# Patient Record
Sex: MALE | Race: WHITE | NOT HISPANIC OR LATINO | Employment: OTHER | URBAN - METROPOLITAN AREA
[De-identification: names, ages, dates, MRNs, and addresses within clinical notes are randomized per-mention and may not be internally consistent; named-entity substitution may affect disease eponyms.]

---

## 2023-03-22 ENCOUNTER — TELEPHONE (OUTPATIENT)
Dept: PRIMARY CARE | Facility: CLINIC | Age: 68
End: 2023-03-22

## 2023-03-22 NOTE — TELEPHONE ENCOUNTER
Pt. Left a message stating his Left foot is swollen 2 sizes over night. Pt. Has an appt. With Dr. Arroyo on 4/4/23 but it sounded as if he should be seen prior? Please Advise.

## 2023-03-22 NOTE — TELEPHONE ENCOUNTER
Called patient.  He awoke with a red swollen foot and ankle this morning.  Advised to go to the ER to rule out a serious health issue.

## 2023-03-24 NOTE — TELEPHONE ENCOUNTER
Patient went to ER and was diagnosed with cellulitis. He was given antibiotics. He would like a referral to a podiatrist in either [place] or [place].

## 2023-03-28 DIAGNOSIS — L03.119 CELLULITIS AND ABSCESS OF FOOT: ICD-10-CM

## 2023-03-28 DIAGNOSIS — L02.619 CELLULITIS AND ABSCESS OF FOOT: ICD-10-CM

## 2023-04-03 PROBLEM — R35.1 BPH ASSOCIATED WITH NOCTURIA: Status: ACTIVE | Noted: 2023-04-03

## 2023-04-03 PROBLEM — E78.5 HYPERLIPIDEMIA: Status: ACTIVE | Noted: 2023-04-03

## 2023-04-03 PROBLEM — R79.89 ELEVATED SERUM CREATININE: Status: ACTIVE | Noted: 2023-04-03

## 2023-04-03 PROBLEM — E79.0 HYPERURICEMIA: Status: ACTIVE | Noted: 2023-04-03

## 2023-04-03 PROBLEM — I10 PRIMARY HYPERTENSION: Status: ACTIVE | Noted: 2023-04-03

## 2023-04-03 PROBLEM — N40.1 BPH ASSOCIATED WITH NOCTURIA: Status: ACTIVE | Noted: 2023-04-03

## 2023-04-03 ASSESSMENT — ACTIVITIES OF DAILY LIVING (ADL)
BATHING: INDEPENDENT
DRESSING: INDEPENDENT

## 2023-04-03 ASSESSMENT — ENCOUNTER SYMPTOMS
LOSS OF SENSATION IN FEET: 0
DEPRESSION: 0
OCCASIONAL FEELINGS OF UNSTEADINESS: 0

## 2023-04-03 ASSESSMENT — PATIENT HEALTH QUESTIONNAIRE - PHQ9
SUM OF ALL RESPONSES TO PHQ9 QUESTIONS 1 AND 2: 0
2. FEELING DOWN, DEPRESSED OR HOPELESS: NOT AT ALL
1. LITTLE INTEREST OR PLEASURE IN DOING THINGS: NOT AT ALL

## 2023-04-03 NOTE — PROGRESS NOTES
Subjective   Reason for Visit: Flavio Velazquez is an 67 y.o. male here for a Medicare Wellness visit.     Past Medical, Surgical, and Family History reviewed and updated in chart.    Reviewed all medications by prescribing practitioner or clinical pharmacist (such as prescriptions, OTCs, herbal therapies and supplements) and documented in the medical record.    He is being followed for hyperlipidemia, hypertension, chronic atrial fibrillation, and hyperuricemia.  Recently he was seen in the emergency room for cellulitis of his foot and followed up with podiatry and has been successfully treated with Augmentin.  In general, he is feeling fine and his blood pressure is well controlled.        Patient Care Team:  Quan Corbett MD as PCP - General  Quan Corbett MD as PCP - [employer] PCP     Review of Systems   All other systems reviewed and are negative.      Objective   Vitals:  /76 (BP Location: Left arm, Patient Position: Sitting, BP Cuff Size: Adult)   Pulse 76   Temp 36 °C (96.8 °F) (Skin)   Ht 1.829 m (6')   Wt 105 kg (232 lb 6.4 oz)   SpO2 97%   BMI 31.52 kg/m²       Physical Exam  Constitutional:       Appearance: Normal appearance.   HENT:      Head: Normocephalic and atraumatic.   Cardiovascular:      Rate and Rhythm: Normal rate and regular rhythm.   Pulmonary:      Effort: Pulmonary effort is normal.      Breath sounds: Normal breath sounds.   Abdominal:      General: Abdomen is flat.      Palpations: Abdomen is soft.   Musculoskeletal:      Cervical back: Normal range of motion and neck supple.   Neurological:      General: No focal deficit present.      Mental Status: He is alert and oriented to person, place, and time.   Psychiatric:         Mood and Affect: Mood normal.         Behavior: Behavior normal.         Thought Content: Thought content normal.         Judgment: Judgment normal.         Assessment/Plan   Problem List Items Addressed This Visit       Primary  hypertension    Relevant Orders    CBC    Comprehensive Metabolic Panel    Hyperlipidemia    Relevant Orders    Lipid Panel    Hyperuricemia    Relevant Orders    Uric acid    BPH associated with nocturia    Relevant Orders    Prostate Specific Antigen    RESOLVED: Elevated serum creatinine     Other Visit Diagnoses       Routine general medical examination at [facility]    -  Primary Medicare annual wellness visit, subsequent        Annual physical exam        Relevant Orders    Fecal Occult Blood Immunoassy

## 2023-04-04 ENCOUNTER — OFFICE VISIT (OUTPATIENT)
Dept: PRIMARY CARE | Facility: CLINIC | Age: 68
End: 2023-04-04
Payer: MEDICARE

## 2023-04-04 ENCOUNTER — LAB (OUTPATIENT)
Dept: LAB | Facility: LAB | Age: 68
End: 2023-04-04
Payer: MEDICARE

## 2023-04-04 VITALS
BODY MASS INDEX: 31.48 KG/M2 | HEART RATE: 76 BPM | DIASTOLIC BLOOD PRESSURE: 76 MMHG | SYSTOLIC BLOOD PRESSURE: 130 MMHG | OXYGEN SATURATION: 97 % | WEIGHT: 232.4 LBS | HEIGHT: 72 IN | TEMPERATURE: 96.8 F

## 2023-04-04 DIAGNOSIS — E79.0 HYPERURICEMIA: ICD-10-CM

## 2023-04-04 DIAGNOSIS — N40.1 BPH ASSOCIATED WITH NOCTURIA: ICD-10-CM

## 2023-04-04 DIAGNOSIS — E78.5 HYPERLIPIDEMIA, UNSPECIFIED HYPERLIPIDEMIA TYPE: ICD-10-CM

## 2023-04-04 DIAGNOSIS — I10 PRIMARY HYPERTENSION: ICD-10-CM

## 2023-04-04 DIAGNOSIS — K21.9 GASTROESOPHAGEAL REFLUX DISEASE WITHOUT ESOPHAGITIS: ICD-10-CM

## 2023-04-04 DIAGNOSIS — Z00.00 ROUTINE GENERAL MEDICAL EXAMINATION AT HEALTH CARE FACILITY: Primary | ICD-10-CM

## 2023-04-04 DIAGNOSIS — Z00.00 MEDICARE ANNUAL WELLNESS VISIT, SUBSEQUENT: ICD-10-CM

## 2023-04-04 DIAGNOSIS — R35.1 BPH ASSOCIATED WITH NOCTURIA: ICD-10-CM

## 2023-04-04 DIAGNOSIS — R79.89 ELEVATED SERUM CREATININE: ICD-10-CM

## 2023-04-04 DIAGNOSIS — Z00.00 ANNUAL PHYSICAL EXAM: ICD-10-CM

## 2023-04-04 LAB
CHOLESTEROL (MG/DL) IN SER/PLAS: 148 MG/DL (ref 0–199)
CHOLESTEROL IN HDL (MG/DL) IN SER/PLAS: 45.5 MG/DL
CHOLESTEROL/HDL RATIO: 3.3
ERYTHROCYTE DISTRIBUTION WIDTH (RATIO) BY AUTOMATED COUNT: 14.6 % (ref 11.5–14.5)
ERYTHROCYTE MEAN CORPUSCULAR HEMOGLOBIN CONCENTRATION (G/DL) BY AUTOMATED: 33.4 G/DL (ref 32–36)
ERYTHROCYTE MEAN CORPUSCULAR VOLUME (FL) BY AUTOMATED COUNT: 99 FL (ref 80–100)
ERYTHROCYTES (10*6/UL) IN BLOOD BY AUTOMATED COUNT: 4.03 X10E12/L (ref 4.5–5.9)
HEMATOCRIT (%) IN BLOOD BY AUTOMATED COUNT: 39.8 % (ref 41–52)
HEMOGLOBIN (G/DL) IN BLOOD: 13.3 G/DL (ref 13.5–17.5)
LDL: 46 MG/DL (ref 0–99)
LEUKOCYTES (10*3/UL) IN BLOOD BY AUTOMATED COUNT: 4.6 X10E9/L (ref 4.4–11.3)
NON HDL CHOLESTEROL: 103 MG/DL
PLATELETS (10*3/UL) IN BLOOD AUTOMATED COUNT: 157 X10E9/L (ref 150–450)
PROSTATE SPECIFIC AG (NG/ML) IN SER/PLAS: 0.73 NG/ML (ref 0–4)
TRIGLYCERIDE (MG/DL) IN SER/PLAS: 284 MG/DL (ref 0–149)
URATE (MG/DL) IN SER/PLAS: 4 MG/DL (ref 4–7.5)
VLDL: 57 MG/DL (ref 0–40)

## 2023-04-04 PROCEDURE — 99214 OFFICE O/P EST MOD 30 MIN: CPT | Performed by: FAMILY MEDICINE

## 2023-04-04 PROCEDURE — 36415 COLL VENOUS BLD VENIPUNCTURE: CPT

## 2023-04-04 PROCEDURE — G0439 PPPS, SUBSEQ VISIT: HCPCS | Performed by: FAMILY MEDICINE

## 2023-04-04 PROCEDURE — 1036F TOBACCO NON-USER: CPT | Performed by: FAMILY MEDICINE

## 2023-04-04 PROCEDURE — 3078F DIAST BP <80 MM HG: CPT | Performed by: FAMILY MEDICINE

## 2023-04-04 PROCEDURE — 84550 ASSAY OF BLOOD/URIC ACID: CPT

## 2023-04-04 PROCEDURE — 80053 COMPREHEN METABOLIC PANEL: CPT

## 2023-04-04 PROCEDURE — 85027 COMPLETE CBC AUTOMATED: CPT

## 2023-04-04 PROCEDURE — G0103 PSA SCREENING: HCPCS

## 2023-04-04 PROCEDURE — 3075F SYST BP GE 130 - 139MM HG: CPT | Performed by: FAMILY MEDICINE

## 2023-04-04 PROCEDURE — 1170F FXNL STATUS ASSESSED: CPT | Performed by: FAMILY MEDICINE

## 2023-04-04 PROCEDURE — 99397 PER PM REEVAL EST PAT 65+ YR: CPT | Performed by: FAMILY MEDICINE

## 2023-04-04 PROCEDURE — 1159F MED LIST DOCD IN RCRD: CPT | Performed by: FAMILY MEDICINE

## 2023-04-04 PROCEDURE — 80061 LIPID PANEL: CPT

## 2023-04-04 RX ORDER — APIXABAN 5 MG/1
5 TABLET, FILM COATED ORAL 2 TIMES DAILY
COMMUNITY
End: 2023-10-10 | Stop reason: SDUPTHER

## 2023-04-04 RX ORDER — ALLOPURINOL 300 MG/1
300 TABLET ORAL DAILY
COMMUNITY
End: 2023-05-08

## 2023-04-04 RX ORDER — ATORVASTATIN CALCIUM 40 MG/1
40 TABLET, FILM COATED ORAL DAILY
Qty: 90 TABLET | Refills: 3 | Status: SHIPPED | OUTPATIENT
Start: 2023-04-04 | End: 2024-04-05 | Stop reason: SDUPTHER

## 2023-04-04 RX ORDER — CELECOXIB 200 MG/1
200 CAPSULE ORAL DAILY
COMMUNITY

## 2023-04-04 RX ORDER — PANTOPRAZOLE SODIUM 40 MG/1
40 TABLET, DELAYED RELEASE ORAL DAILY
COMMUNITY
End: 2023-04-04 | Stop reason: SDUPTHER

## 2023-04-04 RX ORDER — ATORVASTATIN CALCIUM 40 MG/1
40 TABLET, FILM COATED ORAL DAILY
COMMUNITY
End: 2023-04-04 | Stop reason: SDUPTHER

## 2023-04-04 RX ORDER — DIGOXIN 250 MCG
125 TABLET ORAL DAILY
COMMUNITY
End: 2023-05-25

## 2023-04-04 RX ORDER — PANTOPRAZOLE SODIUM 40 MG/1
40 TABLET, DELAYED RELEASE ORAL DAILY
Qty: 90 TABLET | Refills: 3 | Status: SHIPPED | OUTPATIENT
Start: 2023-04-04 | End: 2024-04-05 | Stop reason: SDUPTHER

## 2023-04-04 RX ORDER — METOPROLOL SUCCINATE 50 MG/1
50 TABLET, EXTENDED RELEASE ORAL 2 TIMES DAILY
COMMUNITY
End: 2023-10-18 | Stop reason: SDUPTHER

## 2023-04-04 RX ORDER — TAMSULOSIN HYDROCHLORIDE 0.4 MG/1
0.4 CAPSULE ORAL DAILY
COMMUNITY
End: 2024-01-02

## 2023-04-04 ASSESSMENT — PATIENT HEALTH QUESTIONNAIRE - PHQ9
2. FEELING DOWN, DEPRESSED OR HOPELESS: NOT AT ALL
SUM OF ALL RESPONSES TO PHQ9 QUESTIONS 1 AND 2: 0
1. LITTLE INTEREST OR PLEASURE IN DOING THINGS: NOT AT ALL

## 2023-04-04 ASSESSMENT — ACTIVITIES OF DAILY LIVING (ADL)
BATHING: INDEPENDENT
DRESSING: INDEPENDENT
MANAGING_FINANCES: INDEPENDENT
DOING_HOUSEWORK: INDEPENDENT
TAKING_MEDICATION: INDEPENDENT
GROCERY_SHOPPING: INDEPENDENT

## 2023-04-05 LAB
ALANINE AMINOTRANSFERASE (SGPT) (U/L) IN SER/PLAS: 17 U/L (ref 10–52)
ALBUMIN (G/DL) IN SER/PLAS: 4.1 G/DL (ref 3.4–5)
ALKALINE PHOSPHATASE (U/L) IN SER/PLAS: 57 U/L (ref 33–136)
ANION GAP IN SER/PLAS: 15 MMOL/L (ref 10–20)
ASPARTATE AMINOTRANSFERASE (SGOT) (U/L) IN SER/PLAS: 37 U/L (ref 9–39)
BILIRUBIN TOTAL (MG/DL) IN SER/PLAS: 1.3 MG/DL (ref 0–1.2)
CALCIUM (MG/DL) IN SER/PLAS: 8.8 MG/DL (ref 8.6–10.3)
CARBON DIOXIDE, TOTAL (MMOL/L) IN SER/PLAS: 23 MMOL/L (ref 21–32)
CHLORIDE (MMOL/L) IN SER/PLAS: 104 MMOL/L (ref 98–107)
CREATININE (MG/DL) IN SER/PLAS: 1.36 MG/DL (ref 0.5–1.3)
GFR MALE: 56 ML/MIN/1.73M2
GLUCOSE (MG/DL) IN SER/PLAS: 100 MG/DL (ref 74–99)
POTASSIUM (MMOL/L) IN SER/PLAS: 4.4 MMOL/L (ref 3.5–5.3)
PROTEIN TOTAL: 6.9 G/DL (ref 6.4–8.2)
SODIUM (MMOL/L) IN SER/PLAS: 138 MMOL/L (ref 136–145)
UREA NITROGEN (MG/DL) IN SER/PLAS: 16 MG/DL (ref 6–23)

## 2023-04-25 ASSESSMENT — ACTIVITIES OF DAILY LIVING (ADL)
DOING_HOUSEWORK: INDEPENDENT
DRESSING: INDEPENDENT
MANAGING_FINANCES: INDEPENDENT
GROCERY_SHOPPING: INDEPENDENT
TAKING_MEDICATION: INDEPENDENT
BATHING: INDEPENDENT

## 2023-04-25 ASSESSMENT — PATIENT HEALTH QUESTIONNAIRE - PHQ9
1. LITTLE INTEREST OR PLEASURE IN DOING THINGS: NOT AT ALL
2. FEELING DOWN, DEPRESSED OR HOPELESS: NOT AT ALL
SUM OF ALL RESPONSES TO PHQ9 QUESTIONS 1 AND 2: 0

## 2023-04-25 NOTE — PROGRESS NOTES
Subjective   Reason for Visit: Flavio Velazquez is an 67 y.o. male here for a Medicare Wellness visit.     Past Medical, Surgical, and Family History reviewed and updated in chart.         HPI    Patient Care Team:  Quan Corbett MD as PCP - General  Quan Corbett MD as PCP - [employer] PCP     Review of Systems    Objective   Vitals:  /76 (BP Location: Left arm, Patient Position: Sitting, BP Cuff Size: Adult)   Pulse 76   Temp 36 °C (96.8 °F) (Skin)   Ht 1.829 m (6')   Wt 105 kg (232 lb 6.4 oz)   SpO2 97%   BMI 31.52 kg/m²       Physical Exam    Assessment/Plan   Problem List Items Addressed This Visit       Primary hypertension    Relevant Orders    CBC (Completed)    Comprehensive Metabolic Panel (Completed)    Hyperlipidemia    Relevant Medications    atorvastatin (Lipitor) 40 mg tablet    Other Relevant Orders    Lipid Panel (Completed)    Hyperuricemia    Relevant Orders    Uric acid (Completed)    BPH associated with nocturia    Relevant Orders    Prostate Specific Antigen (Completed)    RESOLVED: Elevated serum creatinine     Other Visit Diagnoses       Routine general medical examination at health care facility    -  Primary    Medicare annual wellness visit, subsequent        Annual physical exam        Relevant Orders    Fecal Occult Blood Immunoassy    Gastroesophageal reflux disease without esophagitis        Relevant Medications    pantoprazole (ProtoNix) 40 mg EC tablet

## 2023-05-07 DIAGNOSIS — E79.0 HYPERURICEMIA WITHOUT SIGNS OF INFLAMMATORY ARTHRITIS AND TOPHACEOUS DISEASE: ICD-10-CM

## 2023-05-08 RX ORDER — ALLOPURINOL 300 MG/1
TABLET ORAL
Qty: 90 TABLET | Refills: 3 | Status: SHIPPED | OUTPATIENT
Start: 2023-05-08 | End: 2024-04-08 | Stop reason: SDUPTHER

## 2023-05-25 DIAGNOSIS — I50.9 CONGESTIVE HEART FAILURE, UNSPECIFIED HF CHRONICITY, UNSPECIFIED HEART FAILURE TYPE (MULTI): Primary | ICD-10-CM

## 2023-05-25 RX ORDER — DIGOXIN 250 MCG
TABLET ORAL
Qty: 45 TABLET | Refills: 3 | Status: SHIPPED | OUTPATIENT
Start: 2023-05-25 | End: 2023-10-23 | Stop reason: SINTOL

## 2023-10-09 ENCOUNTER — TELEPHONE (OUTPATIENT)
Dept: CARDIOLOGY | Facility: CLINIC | Age: 68
End: 2023-10-09
Payer: MEDICARE

## 2023-10-09 DIAGNOSIS — I48.19 PERSISTENT ATRIAL FIBRILLATION (MULTI): Primary | ICD-10-CM

## 2023-10-10 RX ORDER — APIXABAN 5 MG/1
5 TABLET, FILM COATED ORAL 2 TIMES DAILY
Qty: 28 TABLET | Refills: 0 | Status: SHIPPED | OUTPATIENT
Start: 2023-10-10 | End: 2023-10-24 | Stop reason: SDUPTHER

## 2023-10-18 ENCOUNTER — TELEPHONE (OUTPATIENT)
Dept: CARDIOLOGY | Facility: CLINIC | Age: 68
End: 2023-10-18
Payer: MEDICARE

## 2023-10-18 DIAGNOSIS — I48.19 PERSISTENT ATRIAL FIBRILLATION (MULTI): Primary | ICD-10-CM

## 2023-10-18 RX ORDER — METOPROLOL SUCCINATE 50 MG/1
50 TABLET, EXTENDED RELEASE ORAL 2 TIMES DAILY
Qty: 10 TABLET | Refills: 0 | Status: SHIPPED | OUTPATIENT
Start: 2023-10-18 | End: 2023-10-24 | Stop reason: SDUPTHER

## 2023-10-21 PROBLEM — I35.0 NONRHEUMATIC AORTIC (VALVE) STENOSIS: Status: ACTIVE | Noted: 2023-10-21

## 2023-10-21 PROBLEM — R94.31 ABNORMAL ELECTROCARDIOGRAM (ECG) (EKG): Status: ACTIVE | Noted: 2018-10-11

## 2023-10-21 PROBLEM — F23 BRIEF PSYCHOTIC DISORDER (MULTI): Status: ACTIVE | Noted: 2018-10-11

## 2023-10-21 PROBLEM — G47.33 OBSTRUCTIVE SLEEP APNEA (ADULT) (PEDIATRIC): Status: ACTIVE | Noted: 2018-10-11

## 2023-10-21 PROBLEM — I48.19 OTHER PERSISTENT ATRIAL FIBRILLATION (MULTI): Status: ACTIVE | Noted: 2023-10-21

## 2023-10-21 PROBLEM — E83.42 HYPOMAGNESEMIA: Status: ACTIVE | Noted: 2018-10-11

## 2023-10-21 PROBLEM — I48.91 ATRIAL FIBRILLATION WITH RAPID VENTRICULAR RESPONSE (MULTI): Status: ACTIVE | Noted: 2018-09-27

## 2023-10-21 PROBLEM — R82.4 ACETONURIA: Status: ACTIVE | Noted: 2018-10-11

## 2023-10-21 PROBLEM — D52.9 FOLATE DEFICIENCY ANEMIA: Status: ACTIVE | Noted: 2018-10-11

## 2023-10-21 PROBLEM — F10.10 ALCOHOL ABUSE: Status: ACTIVE | Noted: 2018-10-04

## 2023-10-21 PROBLEM — D64.9 NORMOCYTIC ANEMIA: Status: ACTIVE | Noted: 2018-09-27

## 2023-10-21 PROBLEM — R17 SERUM TOTAL BILIRUBIN ELEVATED: Status: ACTIVE | Noted: 2018-09-27

## 2023-10-21 PROBLEM — E71.32: Status: ACTIVE | Noted: 2018-10-11

## 2023-10-21 PROBLEM — F22 DELUSIONAL DISORDERS (MULTI): Status: ACTIVE | Noted: 2018-10-11

## 2023-10-21 PROBLEM — I48.0 PAROXYSMAL ATRIAL FIBRILLATION (MULTI): Status: ACTIVE | Noted: 2023-10-21

## 2023-10-21 PROBLEM — I50.21 ACUTE SYSTOLIC CONGESTIVE HEART FAILURE (MULTI): Status: ACTIVE | Noted: 2018-10-10

## 2023-10-21 PROBLEM — R41.82 ALTERED MENTAL STATUS, UNSPECIFIED: Status: ACTIVE | Noted: 2018-10-11

## 2023-10-21 PROBLEM — M10.9 ARTHRITIS, GOUTY: Status: ACTIVE | Noted: 2023-10-21

## 2023-10-21 PROBLEM — E87.20 ACIDOSIS: Status: ACTIVE | Noted: 2018-10-11

## 2023-10-21 PROBLEM — E83.39 OTHER DISORDERS OF PHOSPHORUS METABOLISM: Status: ACTIVE | Noted: 2018-10-11

## 2023-10-21 PROBLEM — N17.9 AKI (ACUTE KIDNEY INJURY) (CMS-HCC): Status: ACTIVE | Noted: 2018-09-27

## 2023-10-21 PROBLEM — G93.41 METABOLIC ENCEPHALOPATHY: Status: ACTIVE | Noted: 2018-10-11

## 2023-10-21 PROBLEM — R63.8 OTHER SYMPTOMS AND SIGNS CONCERNING FOOD AND FLUID INTAKE: Status: ACTIVE | Noted: 2018-10-11

## 2023-10-21 PROBLEM — K70.10 ALCOHOLIC HEPATITIS WITHOUT ASCITES (MULTI): Status: ACTIVE | Noted: 2018-10-11

## 2023-10-21 PROBLEM — E87.6 HYPOKALEMIA: Status: ACTIVE | Noted: 2018-10-11

## 2023-10-21 PROBLEM — E80.7 DISORDER OF BILIRUBIN METABOLISM, UNSPECIFIED: Status: ACTIVE | Noted: 2018-10-11

## 2023-10-21 PROBLEM — L03.113 RIGHT ARM CELLULITIS: Status: ACTIVE | Noted: 2018-10-10

## 2023-10-21 PROBLEM — E53.8 DEFICIENCY OF OTHER SPECIFIED B GROUP VITAMINS: Status: ACTIVE | Noted: 2018-10-11

## 2023-10-21 PROBLEM — K44.9 DIAPHRAGMATIC HERNIA WITHOUT OBSTRUCTION OR GANGRENE: Status: ACTIVE | Noted: 2018-10-11

## 2023-10-21 PROBLEM — K44.9 HIATAL HERNIA: Status: ACTIVE | Noted: 2018-10-05

## 2023-10-21 PROBLEM — R20.2 PARESTHESIAS: Status: ACTIVE | Noted: 2023-10-21

## 2023-10-21 PROBLEM — K20.90 ESOPHAGITIS: Status: ACTIVE | Noted: 2018-10-05

## 2023-10-21 PROBLEM — R17 UNSPECIFIED JAUNDICE: Status: ACTIVE | Noted: 2018-10-11

## 2023-10-21 PROBLEM — R53.81 DEBILITY: Status: ACTIVE | Noted: 2018-10-10

## 2023-10-21 PROBLEM — K21.9 ACID REFLUX: Status: ACTIVE | Noted: 2023-10-21

## 2023-10-21 PROBLEM — D69.6 THROMBOCYTOPENIA, UNSPECIFIED (CMS-HCC): Status: ACTIVE | Noted: 2018-10-11

## 2023-10-21 PROBLEM — E53.8 FOLATE DEFICIENCY: Status: ACTIVE | Noted: 2018-09-27

## 2023-10-21 RX ORDER — AMOXICILLIN 875 MG/1
875 TABLET, FILM COATED ORAL EVERY 12 HOURS
COMMUNITY
Start: 2022-11-23 | End: 2023-10-23 | Stop reason: ALTCHOICE

## 2023-10-21 RX ORDER — METOPROLOL TARTRATE 50 MG/1
50 TABLET ORAL 2 TIMES DAILY
COMMUNITY
End: 2023-10-23 | Stop reason: ALTCHOICE

## 2023-10-23 ENCOUNTER — OFFICE VISIT (OUTPATIENT)
Dept: CARDIOLOGY | Facility: CLINIC | Age: 68
End: 2023-10-23
Payer: MEDICARE

## 2023-10-23 VITALS
SYSTOLIC BLOOD PRESSURE: 129 MMHG | TEMPERATURE: 97.7 F | HEART RATE: 73 BPM | WEIGHT: 239 LBS | DIASTOLIC BLOOD PRESSURE: 63 MMHG | BODY MASS INDEX: 30.67 KG/M2 | HEIGHT: 74 IN

## 2023-10-23 DIAGNOSIS — I10 PRIMARY HYPERTENSION: Primary | ICD-10-CM

## 2023-10-23 DIAGNOSIS — E78.2 MIXED HYPERLIPIDEMIA: ICD-10-CM

## 2023-10-23 DIAGNOSIS — I35.0 NONRHEUMATIC AORTIC (VALVE) STENOSIS: ICD-10-CM

## 2023-10-23 DIAGNOSIS — I48.11 LONGSTANDING PERSISTENT ATRIAL FIBRILLATION (MULTI): ICD-10-CM

## 2023-10-23 PROBLEM — I48.0 PAROXYSMAL ATRIAL FIBRILLATION (MULTI): Status: RESOLVED | Noted: 2023-10-21 | Resolved: 2023-10-23

## 2023-10-23 PROBLEM — I48.19 OTHER PERSISTENT ATRIAL FIBRILLATION (MULTI): Status: RESOLVED | Noted: 2023-10-21 | Resolved: 2023-10-23

## 2023-10-23 PROBLEM — I50.21 ACUTE SYSTOLIC CONGESTIVE HEART FAILURE (MULTI): Status: RESOLVED | Noted: 2018-10-10 | Resolved: 2023-10-23

## 2023-10-23 PROBLEM — I48.91 ATRIAL FIBRILLATION WITH RAPID VENTRICULAR RESPONSE (MULTI): Status: RESOLVED | Noted: 2018-09-27 | Resolved: 2023-10-23

## 2023-10-23 LAB
ATRIAL RATE: 51 BPM
Q ONSET: 224 MS
QRS COUNT: 9 BEATS
QRS DURATION: 84 MS
QT INTERVAL: 412 MS
QTC CALCULATION(BAZETT): 390 MS
QTC FREDERICIA: 397 MS
R AXIS: 53 DEGREES
T AXIS: 15 DEGREES
T OFFSET: 430 MS
VENTRICULAR RATE: 54 BPM

## 2023-10-23 PROCEDURE — 1036F TOBACCO NON-USER: CPT | Performed by: INTERNAL MEDICINE

## 2023-10-23 PROCEDURE — 93005 ELECTROCARDIOGRAM TRACING: CPT | Performed by: INTERNAL MEDICINE

## 2023-10-23 PROCEDURE — 1160F RVW MEDS BY RX/DR IN RCRD: CPT | Performed by: INTERNAL MEDICINE

## 2023-10-23 PROCEDURE — 99214 OFFICE O/P EST MOD 30 MIN: CPT | Performed by: INTERNAL MEDICINE

## 2023-10-23 PROCEDURE — 3074F SYST BP LT 130 MM HG: CPT | Performed by: INTERNAL MEDICINE

## 2023-10-23 PROCEDURE — 3078F DIAST BP <80 MM HG: CPT | Performed by: INTERNAL MEDICINE

## 2023-10-23 PROCEDURE — 93010 ELECTROCARDIOGRAM REPORT: CPT | Performed by: INTERNAL MEDICINE

## 2023-10-23 PROCEDURE — 1159F MED LIST DOCD IN RCRD: CPT | Performed by: INTERNAL MEDICINE

## 2023-10-23 PROCEDURE — 99214 OFFICE O/P EST MOD 30 MIN: CPT | Mod: 25 | Performed by: INTERNAL MEDICINE

## 2023-10-23 NOTE — PROGRESS NOTES
"Chief Complaint:   Atrial Fibrillation     History Of Present Illness:    Flavio Velazquez is a 68 y.o. male presenting with for routine follow-up of long-standing (>12 months) persistent atrial fibrillation.  The patient has been asymptomatic.  The patient has been rate-controlled in atrial fibrillation on medical treatment which they are tolerating well and are compliant.  The current treatment strategy is rate control.  The CHADS2-VASC2 score is 2  and the ACC/AHA guidelines recommend anticoagulation for stroke prophylaxis.  The patient is being treated with and has tolerated treatment with no bleeding and is compliant.      Review of Systems  All pertinent systems have been reviewed and are negative except for what is stated in the history of present illness.    All other systems have been reviewed and are negative and noncontributory to this patient's current ailments.  .     Last Recorded Vitals:  Visit Vitals  /63   Pulse 73   Temp 36.5 °C (97.7 °F)   Ht 1.88 m (6' 2\")   Wt 108 kg (239 lb)   BMI 30.69 kg/m²   Smoking Status Never   BSA 2.37 m²        Past Medical History:  He has a past medical history of Encounter for general adult medical examination without abnormal findings (06/08/2021), Impacted cerumen, bilateral (09/01/2020), Localized edema (10/07/2019), Longstanding persistent atrial fibrillation (CMS/HCC) (10/23/2023), Lymphedema, not elsewhere classified (05/08/2019), Other fecal abnormalities (09/29/2020), Other specified soft tissue disorders (11/07/2018), Suppurative otitis media, unspecified, right ear (11/17/2020), and Transient cerebral ischemic attack, unspecified (10/28/2014).    Past Surgical History:  He has a past surgical history that includes MR angio neck wo IV contrast (10/21/2014) and MR angio head wo IV contrast (10/21/2014).      Social History:  He reports that he has never smoked. He has never been exposed to tobacco smoke. He has never used smokeless tobacco. He reports " current alcohol use of about 12.0 standard drinks of alcohol per week. He reports that he does not use drugs.    Family History:  No family history on file.     Allergies:  Patient has no known allergies.    Outpatient Medications:  Current Outpatient Medications   Medication Instructions    allopurinol (Zyloprim) 300 mg tablet TAKE 1 TABLET BY MOUTH EVERY DAY    atorvastatin (LIPITOR) 40 mg, oral, Daily    celecoxib (CELEBREX) 200 mg, oral, Daily    digoxin (Lanoxin) 250 MCG tab;et TAKE 1/2 TABLET BY MOUTH EVERY DAY    Eliquis 5 mg, oral, 2 times daily    metoprolol succinate XL (TOPROL-XL) 50 mg, oral, 2 times daily    pantoprazole (PROTONIX) 40 mg, oral, Daily    tamsulosin (FLOMAX) 0.4 mg, oral, Daily       Physical Exam:  Physical Exam  Vitals reviewed.   Constitutional:       General: He is not in acute distress.     Appearance: Normal appearance.   HENT:      Head: Normocephalic and atraumatic.      Nose: Nose normal.   Eyes:      Conjunctiva/sclera: Conjunctivae normal.   Cardiovascular:      Rate and Rhythm: Normal rate. Rhythm irregular.      Pulses: Normal pulses.      Heart sounds: Murmur heard.   Pulmonary:      Effort: Pulmonary effort is normal. No respiratory distress.      Breath sounds: Normal breath sounds. No wheezing, rhonchi or rales.   Abdominal:      General: Bowel sounds are normal. There is no distension.      Palpations: Abdomen is soft.      Tenderness: There is no abdominal tenderness.   Musculoskeletal:         General: No swelling.      Right lower leg: No edema.      Left lower leg: No edema.   Skin:     General: Skin is warm and dry.      Capillary Refill: Capillary refill takes less than 2 seconds.   Neurological:      General: No focal deficit present.      Mental Status: He is alert.   Psychiatric:         Mood and Affect: Mood normal.           Last Labs:  CBC -  Lab Results   Component Value Date    WBC 4.6 04/04/2023    HGB 13.3 (L) 04/04/2023    HCT 39.8 (L) 04/04/2023    MCV  99 04/04/2023     04/04/2023       CMP -  Lab Results   Component Value Date    CALCIUM 8.8 04/04/2023    PROT 6.9 04/04/2023    ALBUMIN 4.1 04/04/2023    AST 37 04/04/2023    ALT 17 04/04/2023    ALKPHOS 57 04/04/2023    BILITOT 1.3 (H) 04/04/2023       LIPID PANEL -   Lab Results   Component Value Date    CHOL 148 04/04/2023    HDL 45.5 04/04/2023    CHHDL 3.3 04/04/2023    VLDL 57 (H) 04/04/2023    TRIG 284 (H) 04/04/2023    NHDL 103 04/04/2023       RENAL FUNCTION PANEL -   Lab Results   Component Value Date    K 4.4 04/04/2023       Lab Results   Component Value Date    HGBA1C 5.7 10/07/2019       Last Cardiology Tests:  ECG:  No results found for this or any previous visit from the past 1095 days.    ECG - AF 54  Echo:  No echocardiogram results found for the past 12 months           Assessment/Plan       1. Longstanding persistent atrial fibrillation (CMS/HCC)  Persistent trial fibrillation: The patient has been clinically stable, asymptomatic with persistent, rate-controlled atrial fibrillation as detailed in the HPI.  They will continue treatment with rate-controlling medications and anticoagulation for stroke prophylaxis based upon their present [id number] score, the risks and benefits of which were discussed with the patient/family/caregiver. Stop Digoxin.  - ECG 12 lead (Clinic Performed)  - Follow Up In Cardiology; Future    2. Primary hypertension  Hypertension: The patient's blood pressure has been well-controlled at today's appointment or by recent primary care provider's measurements/home measurements and meets their goal blood pressure per the ACC/AHA guidelines.  The patient has been compliant with their anti-hypertensive medications and is following a low sodium/DASH diet and performs regular exercise.  I advised continuation of their present medical treatment and lifestyle modification.     - Follow Up In Cardiology; Future    3. Mixed hyperlipidemia  Hyperlipidemia: The patient's lipids  are well controlled on chronic statin therapy and they are meeting their goal LDL cholesterol per the ACC/AHA guidelines.  The patient is compliant and tolerating statin therapy and is following a heart health diet.  The benefits of lipid lowering therapy have been discussed with the patient/family/caregiver.     - Follow Up In Cardiology; Future    4. Nonrheumatic aortic (valve) stenosis  Mild ASX aortic stenosis.  - Follow Up In Cardiology; Future       Jalil Armenta MD    Exclusive of any other services or procedures performed, I, Jalil Armenta MD , spent 30 minutes in duration for this visit today.  This time consisted of chart review, obtaining history, and/or performing the exam as documented above as well as documenting the clinical information for the encounter in the electronic record, discussing treatment options, plans, and/or goals with patient, family, and/or caregiver, refilling medications, updating the electronic record, ordering medicines, lab work, imaging, referrals, and/or procedures as documented above and communicating with other [facility] professionals. I have discussed the results of laboratory, radiology, and cardiology studies with the patient and their family/caregiver.

## 2023-10-24 ENCOUNTER — TELEPHONE (OUTPATIENT)
Dept: CARDIOLOGY | Facility: CLINIC | Age: 68
End: 2023-10-24
Payer: MEDICARE

## 2023-10-24 DIAGNOSIS — I48.19 PERSISTENT ATRIAL FIBRILLATION (MULTI): ICD-10-CM

## 2023-10-24 RX ORDER — METOPROLOL SUCCINATE 50 MG/1
50 TABLET, EXTENDED RELEASE ORAL 2 TIMES DAILY
Qty: 180 TABLET | Refills: 3 | Status: SHIPPED | OUTPATIENT
Start: 2023-10-24 | End: 2024-02-05 | Stop reason: SDUPTHER

## 2023-10-24 RX ORDER — APIXABAN 5 MG/1
5 TABLET, FILM COATED ORAL 2 TIMES DAILY
Qty: 180 TABLET | Refills: 3 | Status: SHIPPED | OUTPATIENT
Start: 2023-10-24 | End: 2024-01-02 | Stop reason: SDUPTHER

## 2024-01-02 DIAGNOSIS — R35.1 NOCTURIA: ICD-10-CM

## 2024-01-02 DIAGNOSIS — I48.19 PERSISTENT ATRIAL FIBRILLATION (MULTI): ICD-10-CM

## 2024-01-02 RX ORDER — TAMSULOSIN HYDROCHLORIDE 0.4 MG/1
0.4 CAPSULE ORAL DAILY
Qty: 90 CAPSULE | Refills: 0 | Status: SHIPPED | OUTPATIENT
Start: 2024-01-02 | End: 2024-04-05 | Stop reason: SDUPTHER

## 2024-01-02 RX ORDER — APIXABAN 5 MG/1
5 TABLET, FILM COATED ORAL 2 TIMES DAILY
Qty: 180 TABLET | Refills: 2 | Status: SHIPPED | OUTPATIENT
Start: 2024-01-02 | End: 2024-01-02

## 2024-01-02 RX ORDER — APIXABAN 5 MG/1
5 TABLET, FILM COATED ORAL 2 TIMES DAILY
Qty: 180 TABLET | Refills: 3 | Status: SHIPPED | OUTPATIENT
Start: 2024-01-02

## 2024-01-09 ENCOUNTER — TELEPHONE (OUTPATIENT)
Dept: CARDIOLOGY | Facility: CLINIC | Age: 69
End: 2024-01-09
Payer: MEDICARE

## 2024-01-09 DIAGNOSIS — I82.4Y2 DEEP VEIN THROMBOSIS (DVT) OF PROXIMAL VEIN OF LEFT LOWER EXTREMITY, UNSPECIFIED CHRONICITY (MULTI): ICD-10-CM

## 2024-01-09 NOTE — TELEPHONE ENCOUNTER
Pt called and stated he has gone to podiatrist for an non healing wound on ankle. He states podiatry (dr. Rahman) would like him to follow up with you as he has left leg edema and wants to r/o cardiac etiology for the edema. I obtained last note from Dr. Rahman and it is under the media tab. Please advise. Thank you

## 2024-01-10 NOTE — TELEPHONE ENCOUNTER
Patient is schedule for vascular ultrasound Jan 17th, 2024 and he was calling for his appointment with Dr. Rincon.   I will watch and make sure apt. Is scheduled.

## 2024-01-10 NOTE — TELEPHONE ENCOUNTER
Left message for pt with below info. And pended all orders to Dr. Armenta    - Are we able to schedule venous ultrasound for pt ?

## 2024-01-17 ENCOUNTER — APPOINTMENT (OUTPATIENT)
Dept: VASCULAR MEDICINE | Facility: CLINIC | Age: 69
End: 2024-01-17
Payer: MEDICARE

## 2024-01-25 ENCOUNTER — HOSPITAL ENCOUNTER (OUTPATIENT)
Dept: VASCULAR MEDICINE | Facility: CLINIC | Age: 69
Discharge: HOME | End: 2024-01-25
Payer: MEDICARE

## 2024-01-25 ENCOUNTER — APPOINTMENT (OUTPATIENT)
Dept: VASCULAR MEDICINE | Facility: CLINIC | Age: 69
End: 2024-01-25
Payer: MEDICARE

## 2024-01-25 DIAGNOSIS — I87.2 VENOUS INSUFFICIENCY (CHRONIC) (PERIPHERAL): ICD-10-CM

## 2024-01-25 DIAGNOSIS — I82.4Y2 DEEP VEIN THROMBOSIS (DVT) OF PROXIMAL VEIN OF LEFT LOWER EXTREMITY, UNSPECIFIED CHRONICITY (MULTI): ICD-10-CM

## 2024-01-25 PROCEDURE — 93971 EXTREMITY STUDY: CPT

## 2024-01-25 PROCEDURE — 93971 EXTREMITY STUDY: CPT | Performed by: SURGERY

## 2024-01-29 ENCOUNTER — TELEPHONE (OUTPATIENT)
Dept: CARDIOLOGY | Facility: CLINIC | Age: 69
End: 2024-01-29
Payer: MEDICARE

## 2024-01-29 NOTE — TELEPHONE ENCOUNTER
----- Message from Jalil Armenta MD sent at 1/27/2024  8:49 PM EST -----  Venous ultrasound shows venous reflux and no clot.  Keep appointment with vascular medicine (we referred).  ----- Message -----  From: Isabella Hillo - Cardiology Results In  Sent: 1/25/2024   3:33 PM EST  To: Jalil Armenta MD

## 2024-02-05 ENCOUNTER — TELEPHONE (OUTPATIENT)
Dept: CARDIOLOGY | Facility: CLINIC | Age: 69
End: 2024-02-05
Payer: MEDICARE

## 2024-02-05 DIAGNOSIS — I48.19 PERSISTENT ATRIAL FIBRILLATION (MULTI): ICD-10-CM

## 2024-02-05 RX ORDER — METOPROLOL SUCCINATE 50 MG/1
50 TABLET, EXTENDED RELEASE ORAL 2 TIMES DAILY
Qty: 180 TABLET | Refills: 2 | Status: SHIPPED | OUTPATIENT
Start: 2024-02-05

## 2024-02-12 ENCOUNTER — APPOINTMENT (OUTPATIENT)
Dept: CARDIOLOGY | Facility: CLINIC | Age: 69
End: 2024-02-12
Payer: MEDICARE

## 2024-02-29 PROBLEM — H91.93 BILATERAL HEARING LOSS: Status: ACTIVE | Noted: 2024-02-29

## 2024-02-29 PROBLEM — R60.0 EDEMA OF LOWER EXTREMITY: Status: ACTIVE | Noted: 2024-02-29

## 2024-02-29 PROBLEM — I89.0 LYMPHEDEMA OF BOTH LOWER EXTREMITIES: Status: ACTIVE | Noted: 2024-02-29

## 2024-02-29 RX ORDER — DIGOXIN 250 MCG
125 TABLET ORAL DAILY
COMMUNITY
Start: 2023-11-25 | End: 2024-03-04 | Stop reason: WASHOUT

## 2024-02-29 RX ORDER — SPIRONOLACTONE 50 MG/1
TABLET, FILM COATED ORAL
COMMUNITY
Start: 2018-11-13 | End: 2024-03-04 | Stop reason: WASHOUT

## 2024-03-04 ENCOUNTER — CONSULT (OUTPATIENT)
Dept: CARDIOLOGY | Facility: CLINIC | Age: 69
End: 2024-03-04
Payer: MEDICARE

## 2024-03-04 VITALS
OXYGEN SATURATION: 98 % | HEIGHT: 74 IN | HEART RATE: 78 BPM | BODY MASS INDEX: 30.42 KG/M2 | DIASTOLIC BLOOD PRESSURE: 88 MMHG | SYSTOLIC BLOOD PRESSURE: 161 MMHG | WEIGHT: 237 LBS

## 2024-03-04 DIAGNOSIS — I87.2 CHRONIC VENOUS INSUFFICIENCY: ICD-10-CM

## 2024-03-04 DIAGNOSIS — L97.322: Primary | ICD-10-CM

## 2024-03-04 DIAGNOSIS — I83.023: Primary | ICD-10-CM

## 2024-03-04 PROCEDURE — 99214 OFFICE O/P EST MOD 30 MIN: CPT | Performed by: INTERNAL MEDICINE

## 2024-03-04 ASSESSMENT — ENCOUNTER SYMPTOMS
LOSS OF SENSATION IN FEET: 1
DEPRESSION: 0
OCCASIONAL FEELINGS OF UNSTEADINESS: 0

## 2024-03-04 ASSESSMENT — PAIN SCALES - GENERAL: PAINLEVEL: 0-NO PAIN

## 2024-03-04 NOTE — PROGRESS NOTES
Referred by Dr. Armenta for chronic leg swelling     History of Present Illness:    Flavio Velazquez is a 67 y/o man with atrial fibrillation on long-term anticoagulation with apixaban referred by Dr. Armenta. Referral reason states DVT but patient has not had a DVT--he has chronic leg swelling for about a year. About 12 weeks ago bumped his leg and got a scratch that has developed into a draining ulcer. He has tried topical ointments and dressing it but no other interventions. He has not used compression.    No history of DVT.       Past Medical History:   Diagnosis Date    Encounter for general adult medical examination without abnormal findings 06/08/2021    Medicare annual wellness visit, initial    Impacted cerumen, bilateral 09/01/2020    Hearing loss of both ears due to cerumen impaction    Localized edema 10/07/2019    Bilateral edema of lower extremity    Longstanding persistent atrial fibrillation (CMS/HCC) 10/23/2023    Lymphedema, not elsewhere classified 05/08/2019    Lymphedema of both lower extremities    Other fecal abnormalities 09/29/2020    Positive FIT (fecal immunochemical test)    Other specified soft tissue disorders 11/07/2018    Foot swelling    Suppurative otitis media, unspecified, right ear 11/17/2020    Suppurative otitis media of right ear, unspecified chronicity    Transient cerebral ischemic attack, unspecified 10/28/2014    TIA (transient ischemic attack)     Past Surgical History:   Procedure Laterality Date    MR HEAD ANGIO WO IV CONTRAST  10/21/2014    MR HEAD ANGIO WO IV CONTRAST 10/21/2014 [facility]    MR NECK ANGIO WO IV CONTRAST  10/21/2014    MR NECK ANGIO WO IV CONTRAST 10/21/2014 [facility]     Social History     Tobacco Use    Smoking status: Never     Passive exposure: Never    Smokeless tobacco: Never   Vaping Use    Vaping Use: Never used   Substance Use Topics    Alcohol use: Yes     Alcohol/week: 12.0 standard drinks of alcohol     Types: 12 Cans of beer  "per week    Drug use: Never       Family History:  No family history on file.     Allergies:  Patient has no known allergies.    Outpatient Medications:  Current Outpatient Medications   Medication Instructions    allopurinol (Zyloprim) 300 mg tablet TAKE 1 TABLET BY MOUTH EVERY DAY    atorvastatin (LIPITOR) 40 mg, oral, Daily    celecoxib (CELEBREX) 200 mg, oral, Daily    Eliquis 5 mg, oral, 2 times daily    metoprolol succinate XL (TOPROL-XL) 50 mg, oral, 2 times daily    pantoprazole (PROTONIX) 40 mg, oral, Daily    tamsulosin (FLOMAX) 0.4 mg, oral, Daily        Last Recorded Vitals:  Vitals:    03/04/24 0850   BP: 161/88   BP Location: Left arm   Patient Position: Sitting   BP Cuff Size: Adult long   Pulse: 78   SpO2: 98%   Weight: 108 kg (237 lb)   Height: 1.88 m (6' 2\")       Physical Exam:  General: well-developed, well-nourished, no distress  Head: normocephalic, atraumatic  Eyes: PERRL, anicteric sclerae, no conjunctival injection  ENT: normal oropharynx  Neck: supple, no carotid bruits, no JVD  Lungs: normal respiratory effort, clear to auscultation bilaterally  Heart: regular, normal S1 and S2, no murmurs, rubs or gallops  Abdomen: normal active bowel sounds, soft, non-distended, non-tender  Extremities: no cyanosis or clubbing  Vascular: no edema, pulses 2+ and symmetric  Musculoskeletal: no deformities  Neurological: alert and oriented, no gross neurological deficits  Psychological: normal mood and affect   Skin: bilateral chronic stasis changes--hyperpigmentaiton, hemosiderin staining, xerosis; 1.8 cm x 1.5 cm x 0.4 cm ulcer on left medial distal calf/ankle with granular base and moderate drainage       Last Labs:  CBC -  Lab Results   Component Value Date    WBC 4.6 04/04/2023    HGB 13.3 (L) 04/04/2023    HCT 39.8 (L) 04/04/2023    MCV 99 04/04/2023     04/04/2023       CMP -  Lab Results   Component Value Date    CALCIUM 8.8 04/04/2023    PROT 6.9 04/04/2023    ALBUMIN 4.1 04/04/2023    AST " 37 04/04/2023    ALT 17 04/04/2023    ALKPHOS 57 04/04/2023    BILITOT 1.3 (H) 04/04/2023       LIPID PANEL -   Lab Results   Component Value Date    CHOL 148 04/04/2023    TRIG 284 (H) 04/04/2023    HDL 45.5 04/04/2023    CHHDL 3.3 04/04/2023    LDLF 46 04/04/2023    VLDL 57 (H) 04/04/2023    NHDL 103 04/04/2023       RENAL FUNCTION PANEL -   Lab Results   Component Value Date    GLUCOSE 100 (H) 04/04/2023     04/04/2023    K 4.4 04/04/2023     04/04/2023    CO2 23 04/04/2023    ANIONGAP 15 04/04/2023    BUN 16 04/04/2023    CREATININE 1.36 (H) 04/04/2023    GFRMALE 56 (A) 04/04/2023    CALCIUM 8.8 04/04/2023    ALBUMIN 4.1 04/04/2023        Lab Results   Component Value Date    HGBA1C 5.7 10/07/2019       Venous insufficiency ultrasound 1/25/2024    CONCLUSIONS:  Left Lower Venous Insufficiency: Reflux is noted in the proximal calf great saphenous, mid calf great saphenous and distal calf great saphenous veins. SSV is poorly visualized mid-distal calf.  Left Lower Venous: No evidence of acute deep vein thrombus visualized in the left lower extremity. Enlarge lymphnode noted left groin measuring 13.36 x 23.96 mm.     Imaging & Doppler Findings:     Left           Compress Thrombus  Diam    Time  SFJ              Yes      None  Prox Thigh GSV   Yes      None  Mid Thigh GSV    Yes      None  Knee GSV         Yes      None  Prox Calf GSV    Yes      None   4.0 mm 3.31 sec  Mid Calf GSV     Yes      None   3.2 mm 2.74 sec  Dist Calf GSV    Yes      None   1.6 mm 3.31 sec  SPJ              Yes      None  SSV Prox         Yes      None       Assessment/Plan   Diagnoses and all orders for this visit:  Venous stasis ulcer of left ankle with fat layer exposed with varicose veins (CMS/HCC)  -     Referral to Wound Clinic; Future  -     Referral to Vascular Surgery; Future  Chronic venous insufficiency  -     Referral to Vascular Medicine  -     Referral to Wound Clinic; Future  -     Referral to Vascular Surgery;  Future      Mary Rincon MD, MS

## 2024-03-04 NOTE — PATIENT INSTRUCTIONS
I want you to get scheduled with the wound care clinic. Please call the number for referrals listed on this summary.    I would also like you to see Dr. Kiko Lamb in [place] to talk about whether you are a candidate for a minimally invasive procedure to help speed ulcer healing.    To schedule, you should call the [facility] scheduling line at [phone].

## 2024-03-07 ENCOUNTER — OFFICE VISIT (OUTPATIENT)
Dept: WOUND CARE | Facility: CLINIC | Age: 69
End: 2024-03-07
Payer: MEDICARE

## 2024-03-07 ENCOUNTER — APPOINTMENT (OUTPATIENT)
Dept: WOUND CARE | Facility: CLINIC | Age: 69
End: 2024-03-07
Payer: MEDICARE

## 2024-03-07 PROCEDURE — 99213 OFFICE O/P EST LOW 20 MIN: CPT | Mod: 25

## 2024-03-07 PROCEDURE — 11042 DBRDMT SUBQ TIS 1ST 20SQCM/<: CPT

## 2024-03-14 ENCOUNTER — APPOINTMENT (OUTPATIENT)
Dept: WOUND CARE | Facility: CLINIC | Age: 69
End: 2024-03-14
Payer: MEDICARE

## 2024-03-21 ENCOUNTER — APPOINTMENT (OUTPATIENT)
Dept: WOUND CARE | Facility: CLINIC | Age: 69
End: 2024-03-21
Payer: MEDICARE

## 2024-03-26 ENCOUNTER — OFFICE VISIT (OUTPATIENT)
Dept: WOUND CARE | Facility: CLINIC | Age: 69
End: 2024-03-26
Payer: MEDICARE

## 2024-03-26 PROCEDURE — 11042 DBRDMT SUBQ TIS 1ST 20SQCM/<: CPT | Performed by: CLINICAL NURSE SPECIALIST

## 2024-03-26 PROCEDURE — 11042 DBRDMT SUBQ TIS 1ST 20SQCM/<: CPT | Mod: ZK

## 2024-03-28 ENCOUNTER — OFFICE VISIT (OUTPATIENT)
Dept: VASCULAR SURGERY | Facility: CLINIC | Age: 69
End: 2024-03-28
Payer: MEDICARE

## 2024-03-28 VITALS — DIASTOLIC BLOOD PRESSURE: 85 MMHG | SYSTOLIC BLOOD PRESSURE: 147 MMHG | WEIGHT: 238 LBS | BODY MASS INDEX: 30.56 KG/M2

## 2024-03-28 DIAGNOSIS — L97.322: ICD-10-CM

## 2024-03-28 DIAGNOSIS — I83.023: ICD-10-CM

## 2024-03-28 DIAGNOSIS — I87.2 CHRONIC VENOUS INSUFFICIENCY: ICD-10-CM

## 2024-03-28 PROCEDURE — 3077F SYST BP >= 140 MM HG: CPT | Performed by: SURGERY

## 2024-03-28 PROCEDURE — 1036F TOBACCO NON-USER: CPT | Performed by: SURGERY

## 2024-03-28 PROCEDURE — 3079F DIAST BP 80-89 MM HG: CPT | Performed by: SURGERY

## 2024-03-28 PROCEDURE — 99203 OFFICE O/P NEW LOW 30 MIN: CPT | Performed by: SURGERY

## 2024-03-28 PROCEDURE — 1159F MED LIST DOCD IN RCRD: CPT | Performed by: SURGERY

## 2024-03-28 NOTE — PROGRESS NOTES
Subjective   Patient ID: Flavio Velazquez is a 68 y.o. male who presents for New Patient Visit (Venous stasis left ankle ).  HPI  Patient presents to my office with about 12-week history of a nonhealing stasis ulcer of the left lower extremity.  States it started as a small claude sore essentially unchanged  She did see Dr. Rincon for vascular medicine for evaluation as well.  No current fevers or chills no night sweats  Patient does not claudicate otherwise active and amatory no evidence of digital digital involvement.  Patient has had no prior events similar  No history of deep vein thrombosis noted  Patient states he does have some left lower extremity swelling significantly worse at the end of the day best for second morning.        Review of Systems  Review of Systems    Constitutional:  no generalized malaise overall feels well, energy levels intact, no complaints specifically noted  HEENT:  No blurry vision, no visual aides noted, no hearing loss no ear ache no nose bleeds noted, no dysphagia, no congestion otherwise no pertinent positives noted  Cardiovascular:  no palpitations, chest pain or heaviness noted, no leg swelling, no numbness or tingling in the lower extremity noted  Respiratory:  no shortness of breath, no productive cough noted, no conversation dyspnea or difficulty breathing  Gastrointestinal:  no abdominal pain, no nausea or vomiting, appetite intact, no bowel irregularities noted  Genitourinary:   no urinary incontinence, frequency or urgency issues noted, no hematuria or burning sensation issues  Musculoskeletal:  No muscle aches or pains, no joint discomfort noted, no back pain noted otherwise feels well  Skin: no ulcerations, skin color issues or wounds upper or lower extremities  Neurologic: no dizziness, no hemiplegia, no hemiparesis, no obvious visual deficits noted  Psychiatric: no depression, no memory loss noted, no suicidal ideation  Endocrine: no weight loss or gain, no temperature  concerns hot or cold intolerance  Hemogolotic/Lymphatic: no bruising, excessive bleeding, no swelling in the groins or neck noted      Objective   Physical Exam  Physical exam    Constitutional: alert and in no acute distress verbal  Eyes: No erythema swelling or discharge noted  Neck: supple, symmetric, trachea midline, no masses noted  Cardiovascular: Carotid pulses 2+, no obvious bruit, no Jugular distension noted, no thrill, heart regular rate, lower extremity vascular exam intact, cap refill <2 sec  Pulmonary:  Bilateral breath sounds intact, clear with rales rhonchi or wheeze  Abdomen: soft non tender, no pulsatile masses noted, no rebound rigidity or guarding noted  Skin: intact warm no abnormal turgor  Psychiatric: alert without any obvious cognitive issues, oriented to person, place, and time  1 cm venous stasis ulcer left lower extremity    Assessment/Plan   CEAP 6  Venous duplex does not demonstrate any deep venous insufficiency however in light of the fact that he does complain of swelling as well as stasis ulcer  Will proceed with venogram IVUS possible stenting  Complication not limited infection bleed deep vein thrombosis also noted.         Kiko Lamb DO 03/28/24 10:39 AM

## 2024-03-29 ENCOUNTER — TELEPHONE (OUTPATIENT)
Dept: VASCULAR SURGERY | Facility: CLINIC | Age: 69
End: 2024-03-29
Payer: MEDICARE

## 2024-03-29 NOTE — TELEPHONE ENCOUNTER
Spoke to pt regarding a venogram procedure. Pt prefers to hold off at this time. Will arrange a 6 week follow up to reassess wound. If wound is not improving at that time, a venogram may be necessary

## 2024-03-29 NOTE — TELEPHONE ENCOUNTER
----- Message from Maddie Huber LPN sent at 3/29/2024  4:00 PM EDT -----  Regarding: CANCEL venogram (5/3/24)  PATIENT WANTS TO HOLD OFF- DON'T START CASE  ----- Message -----  From: Maddie Huber LPN  Sent: 3/28/2024  10:51 AM EDT  To: Kiko Lamb, DO; Maddie Huber LPN  Subject: venogram (5/3/24)                                Please start case for 5/3/24     No clearance needed  Patient has pre op instructions       Comments    PATIENT WILL WAIT 6 WEEKS THEN FOLLOW UP, MAY SCHEDULE AT THAT TIME. hE SPOKE WITH MICHEL HERNANDEZ 3/29/24

## 2024-04-04 ENCOUNTER — APPOINTMENT (OUTPATIENT)
Dept: WOUND CARE | Facility: CLINIC | Age: 69
End: 2024-04-04
Payer: MEDICARE

## 2024-04-05 ENCOUNTER — LAB (OUTPATIENT)
Dept: LAB | Facility: LAB | Age: 69
End: 2024-04-05
Payer: MEDICARE

## 2024-04-05 ENCOUNTER — OFFICE VISIT (OUTPATIENT)
Dept: PRIMARY CARE | Facility: CLINIC | Age: 69
End: 2024-04-05
Payer: MEDICARE

## 2024-04-05 VITALS
OXYGEN SATURATION: 97 % | TEMPERATURE: 97.4 F | WEIGHT: 237.6 LBS | DIASTOLIC BLOOD PRESSURE: 68 MMHG | SYSTOLIC BLOOD PRESSURE: 114 MMHG | BODY MASS INDEX: 30.49 KG/M2 | HEIGHT: 74 IN | HEART RATE: 87 BPM

## 2024-04-05 DIAGNOSIS — N40.1 BPH ASSOCIATED WITH NOCTURIA: ICD-10-CM

## 2024-04-05 DIAGNOSIS — I35.0 NONRHEUMATIC AORTIC (VALVE) STENOSIS: ICD-10-CM

## 2024-04-05 DIAGNOSIS — E78.2 MIXED HYPERLIPIDEMIA: ICD-10-CM

## 2024-04-05 DIAGNOSIS — N18.31 STAGE 3A CHRONIC KIDNEY DISEASE (MULTI): ICD-10-CM

## 2024-04-05 DIAGNOSIS — E78.5 HYPERLIPIDEMIA, UNSPECIFIED HYPERLIPIDEMIA TYPE: ICD-10-CM

## 2024-04-05 DIAGNOSIS — Z12.5 SCREENING FOR PROSTATE CANCER: ICD-10-CM

## 2024-04-05 DIAGNOSIS — I10 PRIMARY HYPERTENSION: ICD-10-CM

## 2024-04-05 DIAGNOSIS — Z00.00 MEDICARE ANNUAL WELLNESS VISIT, SUBSEQUENT: Primary | ICD-10-CM

## 2024-04-05 DIAGNOSIS — M10.9 ARTHRITIS, GOUTY: ICD-10-CM

## 2024-04-05 DIAGNOSIS — K21.9 GASTROESOPHAGEAL REFLUX DISEASE WITHOUT ESOPHAGITIS: ICD-10-CM

## 2024-04-05 DIAGNOSIS — D69.6 THROMBOCYTOPENIA, UNSPECIFIED (CMS-HCC): ICD-10-CM

## 2024-04-05 DIAGNOSIS — I48.11 LONGSTANDING PERSISTENT ATRIAL FIBRILLATION (MULTI): ICD-10-CM

## 2024-04-05 DIAGNOSIS — R35.1 BPH ASSOCIATED WITH NOCTURIA: ICD-10-CM

## 2024-04-05 DIAGNOSIS — R35.1 NOCTURIA: ICD-10-CM

## 2024-04-05 PROBLEM — L03.113 RIGHT ARM CELLULITIS: Status: RESOLVED | Noted: 2018-10-10 | Resolved: 2024-04-05

## 2024-04-05 LAB
ALBUMIN SERPL BCP-MCNC: 4.2 G/DL (ref 3.4–5)
ALP SERPL-CCNC: 58 U/L (ref 33–136)
ALT SERPL W P-5'-P-CCNC: 23 U/L (ref 10–52)
ANION GAP SERPL CALC-SCNC: 12 MMOL/L (ref 10–20)
AST SERPL W P-5'-P-CCNC: 38 U/L (ref 9–39)
BILIRUB SERPL-MCNC: 1.1 MG/DL (ref 0–1.2)
BUN SERPL-MCNC: 17 MG/DL (ref 6–23)
CALCIUM SERPL-MCNC: 8.9 MG/DL (ref 8.6–10.3)
CHLORIDE SERPL-SCNC: 103 MMOL/L (ref 98–107)
CHOLEST SERPL-MCNC: 143 MG/DL (ref 0–199)
CHOLESTEROL/HDL RATIO: 2.5
CO2 SERPL-SCNC: 28 MMOL/L (ref 21–32)
CREAT SERPL-MCNC: 1.19 MG/DL (ref 0.5–1.3)
EGFRCR SERPLBLD CKD-EPI 2021: 66 ML/MIN/1.73M*2
ERYTHROCYTE [DISTWIDTH] IN BLOOD BY AUTOMATED COUNT: 14.4 % (ref 11.5–14.5)
GLUCOSE SERPL-MCNC: 90 MG/DL (ref 74–99)
HCT VFR BLD AUTO: 41 % (ref 41–52)
HDLC SERPL-MCNC: 57.4 MG/DL
HGB BLD-MCNC: 13.5 G/DL (ref 13.5–17.5)
LDLC SERPL CALC-MCNC: 55 MG/DL
MCH RBC QN AUTO: 33.4 PG (ref 26–34)
MCHC RBC AUTO-ENTMCNC: 32.9 G/DL (ref 32–36)
MCV RBC AUTO: 102 FL (ref 80–100)
NON HDL CHOLESTEROL: 86 MG/DL (ref 0–149)
NRBC BLD-RTO: 0 /100 WBCS (ref 0–0)
PLATELET # BLD AUTO: 170 X10*3/UL (ref 150–450)
POTASSIUM SERPL-SCNC: 4.5 MMOL/L (ref 3.5–5.3)
PROT SERPL-MCNC: 7.1 G/DL (ref 6.4–8.2)
PSA SERPL-MCNC: 1.14 NG/ML
RBC # BLD AUTO: 4.04 X10*6/UL (ref 4.5–5.9)
SODIUM SERPL-SCNC: 138 MMOL/L (ref 136–145)
TRIGL SERPL-MCNC: 155 MG/DL (ref 0–149)
URATE SERPL-MCNC: 3.7 MG/DL (ref 4–7.5)
VLDL: 31 MG/DL (ref 0–40)
WBC # BLD AUTO: 5.5 X10*3/UL (ref 4.4–11.3)

## 2024-04-05 PROCEDURE — G0439 PPPS, SUBSEQ VISIT: HCPCS | Performed by: NURSE PRACTITIONER

## 2024-04-05 PROCEDURE — 1123F ACP DISCUSS/DSCN MKR DOCD: CPT | Performed by: NURSE PRACTITIONER

## 2024-04-05 PROCEDURE — G0103 PSA SCREENING: HCPCS

## 2024-04-05 PROCEDURE — 80053 COMPREHEN METABOLIC PANEL: CPT

## 2024-04-05 PROCEDURE — 1158F ADVNC CARE PLAN TLK DOCD: CPT | Performed by: NURSE PRACTITIONER

## 2024-04-05 PROCEDURE — 3078F DIAST BP <80 MM HG: CPT | Performed by: NURSE PRACTITIONER

## 2024-04-05 PROCEDURE — 1036F TOBACCO NON-USER: CPT | Performed by: NURSE PRACTITIONER

## 2024-04-05 PROCEDURE — 99214 OFFICE O/P EST MOD 30 MIN: CPT | Performed by: NURSE PRACTITIONER

## 2024-04-05 PROCEDURE — 84550 ASSAY OF BLOOD/URIC ACID: CPT

## 2024-04-05 PROCEDURE — 1160F RVW MEDS BY RX/DR IN RCRD: CPT | Performed by: NURSE PRACTITIONER

## 2024-04-05 PROCEDURE — 1159F MED LIST DOCD IN RCRD: CPT | Performed by: NURSE PRACTITIONER

## 2024-04-05 PROCEDURE — 1170F FXNL STATUS ASSESSED: CPT | Performed by: NURSE PRACTITIONER

## 2024-04-05 PROCEDURE — 85027 COMPLETE CBC AUTOMATED: CPT

## 2024-04-05 PROCEDURE — 80061 LIPID PANEL: CPT

## 2024-04-05 PROCEDURE — 3074F SYST BP LT 130 MM HG: CPT | Performed by: NURSE PRACTITIONER

## 2024-04-05 PROCEDURE — 36415 COLL VENOUS BLD VENIPUNCTURE: CPT

## 2024-04-05 RX ORDER — TAMSULOSIN HYDROCHLORIDE 0.4 MG/1
0.4 CAPSULE ORAL DAILY
Qty: 90 CAPSULE | Refills: 3 | Status: SHIPPED | OUTPATIENT
Start: 2024-04-05

## 2024-04-05 RX ORDER — ATORVASTATIN CALCIUM 40 MG/1
40 TABLET, FILM COATED ORAL DAILY
Qty: 90 TABLET | Refills: 3 | Status: SHIPPED | OUTPATIENT
Start: 2024-04-05 | End: 2024-04-08 | Stop reason: SDUPTHER

## 2024-04-05 RX ORDER — PANTOPRAZOLE SODIUM 40 MG/1
40 TABLET, DELAYED RELEASE ORAL DAILY
Qty: 90 TABLET | Refills: 3 | Status: SHIPPED | OUTPATIENT
Start: 2024-04-05

## 2024-04-05 RX ORDER — MUPIROCIN 20 MG/G
OINTMENT TOPICAL
COMMUNITY
Start: 2024-03-29

## 2024-04-05 ASSESSMENT — ENCOUNTER SYMPTOMS
CHEST TIGHTNESS: 0
VOMITING: 0
NAUSEA: 0
FATIGUE: 0
CHILLS: 0
FEVER: 0
DIARRHEA: 0
SHORTNESS OF BREATH: 0
NUMBNESS: 0
OCCASIONAL FEELINGS OF UNSTEADINESS: 0
HEADACHES: 0
DEPRESSION: 0
PALPITATIONS: 0
LOSS OF SENSATION IN FEET: 1
ABDOMINAL PAIN: 0
DIZZINESS: 0
COUGH: 0
WEAKNESS: 0

## 2024-04-05 ASSESSMENT — PATIENT HEALTH QUESTIONNAIRE - PHQ9
2. FEELING DOWN, DEPRESSED OR HOPELESS: NOT AT ALL
1. LITTLE INTEREST OR PLEASURE IN DOING THINGS: NOT AT ALL
SUM OF ALL RESPONSES TO PHQ9 QUESTIONS 1 & 2: 0

## 2024-04-05 ASSESSMENT — ACTIVITIES OF DAILY LIVING (ADL)
DRESSING: INDEPENDENT
GROCERY_SHOPPING: INDEPENDENT
DOING_HOUSEWORK: INDEPENDENT
TAKING_MEDICATION: INDEPENDENT
BATHING: INDEPENDENT
MANAGING_FINANCES: INDEPENDENT

## 2024-04-05 NOTE — PROGRESS NOTES
Subjective   Reason for Visit: Flavio Velazquez is an 68 y.o. male here for a Medicare Wellness visit.     Past Medical, Surgical, and Family History reviewed and updated in chart.    Reviewed all medications by prescribing practitioner or clinical pharmacist (such as prescriptions, OTCs, herbal therapies and supplements) and documented in the medical record.    He does not have a living will or HCPOA. In the event he is not able to make his own decisions he would want his daughter Patricia Velazquez or son Angelo Velazquez to make decisions for him.    HPI  He presents to the office today for medication refills and follow up.  He is tolerating medications well at current dose- no side effects. No chest pain, shortness of breath, palpitations or edema. No headaches, numbness, tingling, weakness or vision changes.  No dizziness.  Last eye exam: No recent   Diet: Eats a lot of red meat  No fruits and vegetables  Exercise: No exercise     He declines any vaccines.  Declines colon cancer screening.    He reports he has an open area to the left lower leg.  He is following with the wound care clinic and saw vascular surgery as well.   He is doing the dressing every day.    Specialsit  He follows with cardiology for Atrial Fibrillation.  He is following with Dr. Arguelles for right knee pain.    Patient Care Team:  OBED Harding-CNP as PCP - General (Family Medicine)  Quan Corbett MD as PCP - [employer] PCP     Review of Systems   Constitutional:  Negative for chills, fatigue and fever.   Eyes:  Negative for visual disturbance.   Respiratory:  Negative for cough, chest tightness and shortness of breath.    Cardiovascular:  Negative for chest pain, palpitations and leg swelling.   Gastrointestinal:  Negative for abdominal pain, diarrhea, nausea and vomiting.   Neurological:  Negative for dizziness, weakness, numbness and headaches.       Objective   Vitals:  /68 (BP Location: Left arm, Patient Position:  "Sitting, BP Cuff Size: Adult)   Pulse 87   Temp 36.3 °C (97.4 °F) (Temporal)   Ht 1.88 m (6' 2\")   Wt 108 kg (237 lb 9.6 oz)   SpO2 97%   BMI 30.51 kg/m²       Physical Exam  Constitutional:       General: He is not in acute distress.     Appearance: Normal appearance. He is not toxic-appearing.   Eyes:      Extraocular Movements: Extraocular movements intact.      Conjunctiva/sclera: Conjunctivae normal.      Pupils: Pupils are equal, round, and reactive to light.   Neck:      Vascular: No carotid bruit.   Cardiovascular:      Rate and Rhythm: Normal rate and regular rhythm.      Pulses: Normal pulses.      Heart sounds: Normal heart sounds, S1 normal and S2 normal. No murmur heard.     Comments: Trace edema noted to lower extremities  Pulmonary:      Effort: Pulmonary effort is normal. No respiratory distress.      Breath sounds: Normal breath sounds and air entry.   Abdominal:      General: Bowel sounds are normal.      Palpations: Abdomen is soft.      Tenderness: There is no abdominal tenderness.   Musculoskeletal:      Right lower leg: Edema present.      Left lower leg: Edema present.   Lymphadenopathy:      Cervical: No cervical adenopathy.   Skin:     Comments: Dressing to left leg.   Neurological:      Mental Status: He is alert and oriented to person, place, and time.   Psychiatric:         Attention and Perception: Attention normal.         Mood and Affect: Mood and affect normal.         Behavior: Behavior normal. Behavior is cooperative.         Thought Content: Thought content normal.         Cognition and Memory: Cognition normal.         Judgment: Judgment normal.         Assessment/Plan   Problem List Items Addressed This Visit       Primary hypertension    Relevant Orders    Comprehensive Metabolic Panel (Completed)    CBC (Completed)    Hyperlipidemia    Relevant Medications    atorvastatin (Lipitor) 40 mg tablet    Other Relevant Orders    Lipid Panel (Completed)    Acid reflux    Relevant " Medications    pantoprazole (ProtoNix) 40 mg EC tablet    Arthritis, gouty    Relevant Orders    Uric acid (Completed)    Thrombocytopenia, unspecified (CMS/HCC)    Longstanding persistent atrial fibrillation (CMS/HCC)    Stage 3a chronic kidney disease (CMS/HCC)    Relevant Orders    Comprehensive Metabolic Panel (Completed)    Medicare annual wellness visit, subsequent     Other Visit Diagnoses       Screening for prostate cancer    -  Primary    Relevant Orders    Prostate Specific Antigen (Completed)    Nocturia        Relevant Medications    tamsulosin (Flomax) 0.4 mg 24 hr capsule

## 2024-04-06 PROBLEM — E71.32: Status: RESOLVED | Noted: 2018-10-11 | Resolved: 2024-04-06

## 2024-04-06 PROBLEM — F23 BRIEF PSYCHOTIC DISORDER (MULTI): Status: RESOLVED | Noted: 2018-10-11 | Resolved: 2024-04-06

## 2024-04-06 PROBLEM — F22 DELUSIONAL DISORDERS (MULTI): Status: RESOLVED | Noted: 2018-10-11 | Resolved: 2024-04-06

## 2024-04-06 PROBLEM — K70.10 ALCOHOLIC HEPATITIS WITHOUT ASCITES (MULTI): Status: RESOLVED | Noted: 2018-10-11 | Resolved: 2024-04-06

## 2024-04-06 ASSESSMENT — ENCOUNTER SYMPTOMS
COUGH: 0
FATIGUE: 0
VOMITING: 0
NUMBNESS: 0
NAUSEA: 0
CHILLS: 0
FEVER: 0
CHEST TIGHTNESS: 0
DIZZINESS: 0
DIARRHEA: 0
HEADACHES: 0
PALPITATIONS: 0
ABDOMINAL PAIN: 0
WEAKNESS: 0
SHORTNESS OF BREATH: 0

## 2024-04-06 NOTE — PROGRESS NOTES
Subjective   Reason for Visit: Flavio Velazquez is an 68 y.o. male here for a Medicare Wellness visit.     Past Medical, Surgical, and Family History reviewed and updated in chart.    Reviewed all medications by prescribing practitioner or clinical pharmacist (such as prescriptions, OTCs, herbal therapies and supplements) and documented in the medical record.    He does not have a living will or HCPOA. In the event he is not able to make his own decisions he would want his daughter Patricia Velazquez or son Angelo Velazquez to make decisions for him.    HPI  He presents to the office today for medication refills and follow up.  He is tolerating medications well at current dose- no side effects. No chest pain, shortness of breath, palpitations or edema. No headaches, numbness, tingling, weakness or vision changes.  No dizziness.  Last eye exam: No recent   Diet: Eats a lot of red meat  No fruits and vegetables  Exercise: No exercise     He declines any vaccines.  Declines colon cancer screening.    He reports he has an open area to the left lower leg.  He is following with the wound care clinic and saw vascular surgery as well.   He is doing the dressing every day.    Specialists  He follows with cardiology for Atrial Fibrillation.  He is following with Dr. Arguelles for right knee pain.    Patient Care Team:  OBED Harding-CNP as PCP - General (Family Medicine)  Quan Corbett MD as PCP - [employer] PCP     Review of Systems   Constitutional:  Negative for chills, fatigue and fever.   Eyes:  Negative for visual disturbance.   Respiratory:  Negative for cough, chest tightness and shortness of breath.    Cardiovascular:  Negative for chest pain, palpitations and leg swelling.   Gastrointestinal:  Negative for abdominal pain, diarrhea, nausea and vomiting.   Neurological:  Negative for dizziness, weakness, numbness and headaches.       Objective   Vitals:  /68 (BP Location: Left arm, Patient Position:  "Sitting, BP Cuff Size: Adult)   Pulse 87   Temp 36.3 °C (97.4 °F) (Temporal)   Ht 1.88 m (6' 2\")   Wt 108 kg (237 lb 9.6 oz)   SpO2 97%   BMI 30.51 kg/m²       Physical Exam  Constitutional:       General: He is not in acute distress.     Appearance: Normal appearance. He is not toxic-appearing.   Eyes:      Extraocular Movements: Extraocular movements intact.      Conjunctiva/sclera: Conjunctivae normal.      Pupils: Pupils are equal, round, and reactive to light.   Neck:      Vascular: No carotid bruit.   Cardiovascular:      Rate and Rhythm: Normal rate. Rhythm irregularly irregular.      Pulses: Normal pulses.      Heart sounds: Normal heart sounds, S1 normal and S2 normal. No murmur heard.     Comments: Trace edema noted to lower extremities  Pulmonary:      Effort: Pulmonary effort is normal. No respiratory distress.      Breath sounds: Normal breath sounds and air entry.   Abdominal:      General: Bowel sounds are normal.      Palpations: Abdomen is soft.      Tenderness: There is no abdominal tenderness.   Musculoskeletal:      Right lower leg: Edema present.      Left lower leg: Edema present.   Lymphadenopathy:      Cervical: No cervical adenopathy.   Skin:     Comments: Dressing to left leg.   Neurological:      Mental Status: He is alert and oriented to person, place, and time.   Psychiatric:         Attention and Perception: Attention normal.         Mood and Affect: Mood and affect normal.         Behavior: Behavior normal. Behavior is cooperative.         Thought Content: Thought content normal.         Cognition and Memory: Cognition normal.         Judgment: Judgment normal.         Assessment/Plan   Problem List Items Addressed This Visit       Primary hypertension    Current Assessment & Plan     Well-controlled on medications at current dose. continue.         Relevant Orders    Comprehensive Metabolic Panel (Completed)    CBC (Completed)    Hyperlipidemia    Current Assessment & Plan     " Continue statin at the current dose.  Check FLP.         Relevant Medications    atorvastatin (Lipitor) 40 mg tablet    Other Relevant Orders    Lipid Panel (Completed)    BPH associated with nocturia    Current Assessment & Plan     Stable on the current dose of tamsulosin.  Continue.         Acid reflux    Current Assessment & Plan     Stable on the current dose of Protonix.  Continue.         Relevant Medications    pantoprazole (ProtoNix) 40 mg EC tablet    Arthritis, gouty    Relevant Orders    Uric acid (Completed)    Nonrheumatic aortic (valve) stenosis    Current Assessment & Plan     Follows annually with cardiology.         Thrombocytopenia, unspecified (CMS/HCC)    Current Assessment & Plan     Recheck CBC today.         Longstanding persistent atrial fibrillation (CMS/HCC)    Current Assessment & Plan     Rate controlled and on Eliquis.  Follows with cardiology.         Stage 3a chronic kidney disease (CMS/HCC)    Current Assessment & Plan     Check updated labs.  Long discussion today about taking Celebrex with chronic kidney disease.  Advised against daily NSAID use to protect the kidneys.  We also discussed at length the increased risk of bleeding given he is on Eliquis.  Recommended stopping the Celebrex.         Relevant Orders    Comprehensive Metabolic Panel (Completed)    Medicare annual wellness visit, subsequent - Primary    Current Assessment & Plan     Declines vaccines today'.  Declines colonoscopy or colon cancer screening.          Other Visit Diagnoses       Screening for prostate cancer        Relevant Orders    Prostate Specific Antigen (Completed)    Nocturia        Relevant Medications    tamsulosin (Flomax) 0.4 mg 24 hr capsule

## 2024-04-06 NOTE — ASSESSMENT & PLAN NOTE
Check updated labs.  Long discussion today about taking Celebrex with chronic kidney disease.  Advised against daily NSAID use to protect the kidneys.  We also discussed at length the increased risk of bleeding given he is on Eliquis.  Recommended stopping the Celebrex.

## 2024-04-07 DIAGNOSIS — E79.0 HYPERURICEMIA WITHOUT SIGNS OF INFLAMMATORY ARTHRITIS AND TOPHACEOUS DISEASE: ICD-10-CM

## 2024-04-07 DIAGNOSIS — E78.5 HYPERLIPIDEMIA, UNSPECIFIED HYPERLIPIDEMIA TYPE: ICD-10-CM

## 2024-04-08 ENCOUNTER — TELEPHONE (OUTPATIENT)
Dept: PRIMARY CARE | Facility: CLINIC | Age: 69
End: 2024-04-08
Payer: MEDICARE

## 2024-04-08 RX ORDER — ALLOPURINOL 300 MG/1
TABLET ORAL
Qty: 90 TABLET | Refills: 3 | Status: SHIPPED | OUTPATIENT
Start: 2024-04-08 | End: 2024-05-06 | Stop reason: SDUPTHER

## 2024-04-08 RX ORDER — ATORVASTATIN CALCIUM 40 MG/1
40 TABLET, FILM COATED ORAL DAILY
Qty: 90 TABLET | Refills: 3 | Status: SHIPPED | OUTPATIENT
Start: 2024-04-08

## 2024-04-08 NOTE — TELEPHONE ENCOUNTER
----- Message from OBED Harding-CNP sent at 4/6/2024  8:01 AM EDT -----  Please let him know his labs looked good. PSA level was good.  Uric acid level was good.  He still has a mild anemia but this has also improved.  Kidney function is better than last year.  Triglycerides are minimally elevated but better than prior-focus on a low fat and low sugar diet.  Cholesterol looks great otherwise.          Talked with pt about his results and he verbalized understanding. He had no further questions at this time.

## 2024-04-08 NOTE — TELEPHONE ENCOUNTER
----- Message from OBED Harding-CNP sent at 4/6/2024  8:01 AM EDT -----  Please let him know his labs looked good. PSA level was good.  Uric acid level was good.  He still has a mild anemia but this has also improved.  Kidney function is better than last year.  Triglycerides are minimally elevated but better than prior-focus on a low fat and low sugar diet.  Cholesterol looks great otherwise.

## 2024-04-09 ENCOUNTER — HOSPITAL ENCOUNTER (OUTPATIENT)
Facility: HOSPITAL | Age: 69
Setting detail: OUTPATIENT SURGERY
End: 2024-04-09
Attending: SURGERY | Admitting: SURGERY
Payer: MEDICARE

## 2024-05-06 DIAGNOSIS — E79.0 HYPERURICEMIA WITHOUT SIGNS OF INFLAMMATORY ARTHRITIS AND TOPHACEOUS DISEASE: ICD-10-CM

## 2024-05-06 RX ORDER — ALLOPURINOL 300 MG/1
300 TABLET ORAL DAILY
Qty: 90 TABLET | Refills: 3 | Status: SHIPPED | OUTPATIENT
Start: 2024-05-06

## 2024-10-24 ENCOUNTER — APPOINTMENT (OUTPATIENT)
Dept: CARDIOLOGY | Facility: CLINIC | Age: 69
End: 2024-10-24
Payer: MEDICARE

## 2024-11-08 DIAGNOSIS — E79.0 HYPERURICEMIA WITHOUT SIGNS OF INFLAMMATORY ARTHRITIS AND TOPHACEOUS DISEASE: ICD-10-CM

## 2024-11-08 RX ORDER — ALLOPURINOL 300 MG/1
300 TABLET ORAL DAILY
Qty: 90 TABLET | Refills: 1 | Status: SHIPPED | OUTPATIENT
Start: 2024-11-08

## 2024-11-08 NOTE — TELEPHONE ENCOUNTER
Patient called and requesting refill of allopurinol to the [facility].   NOV 4/25/25

## 2024-12-30 ENCOUNTER — APPOINTMENT (OUTPATIENT)
Dept: PRIMARY CARE | Facility: CLINIC | Age: 69
End: 2024-12-30
Payer: MEDICARE

## 2024-12-30 VITALS
HEART RATE: 65 BPM | OXYGEN SATURATION: 98 % | SYSTOLIC BLOOD PRESSURE: 138 MMHG | DIASTOLIC BLOOD PRESSURE: 80 MMHG | TEMPERATURE: 97.6 F

## 2024-12-30 DIAGNOSIS — H61.23 BILATERAL IMPACTED CERUMEN: Primary | ICD-10-CM

## 2024-12-30 PROBLEM — K25.3 CAMERON LESION, ACUTE: Status: ACTIVE | Noted: 2024-12-30

## 2024-12-30 PROBLEM — L89.892 PRESSURE ULCER OF OTHER SITE, STAGE 2 (MULTI): Status: ACTIVE | Noted: 2024-12-30

## 2024-12-30 PROCEDURE — 69210 REMOVE IMPACTED EAR WAX UNI: CPT | Performed by: NURSE PRACTITIONER

## 2024-12-30 PROCEDURE — 1160F RVW MEDS BY RX/DR IN RCRD: CPT | Performed by: NURSE PRACTITIONER

## 2024-12-30 PROCEDURE — 3079F DIAST BP 80-89 MM HG: CPT | Performed by: NURSE PRACTITIONER

## 2024-12-30 PROCEDURE — 1159F MED LIST DOCD IN RCRD: CPT | Performed by: NURSE PRACTITIONER

## 2024-12-30 PROCEDURE — 3075F SYST BP GE 130 - 139MM HG: CPT | Performed by: NURSE PRACTITIONER

## 2024-12-30 PROCEDURE — 99213 OFFICE O/P EST LOW 20 MIN: CPT | Performed by: NURSE PRACTITIONER

## 2024-12-30 PROCEDURE — 1036F TOBACCO NON-USER: CPT | Performed by: NURSE PRACTITIONER

## 2024-12-30 ASSESSMENT — ENCOUNTER SYMPTOMS
CONSTITUTIONAL NEGATIVE: 1
RESPIRATORY NEGATIVE: 1
CARDIOVASCULAR NEGATIVE: 1

## 2024-12-30 NOTE — PROGRESS NOTES
Subjective   Patient ID: Flavio Velazquez is a 69 y.o. male who presents for Hearing Problem (Decreased hearing bilateral ears x last 2-3 weeks).    HPI Presents today with bilateral ear blockage.  This happens every so often to him and it is usually ear wax build up.   Feels fine otherwise  After irrigation left canal clear.   Right canal partially cleared.   Patient unable to tolerated continue flushing  Review of Systems   Constitutional: Negative.    HENT:          As noted in HPI     Respiratory: Negative.     Cardiovascular: Negative.        Objective   /80 (BP Location: Right arm, Patient Position: Sitting)   Pulse 65   Temp 36.4 °C (97.6 °F) (Temporal)   SpO2 98%     Physical Exam  Constitutional:       Appearance: Normal appearance.   HENT:      Ears:      Comments: Bilateral cerumen impaction.      Mouth/Throat:      Mouth: Mucous membranes are moist.      Pharynx: Oropharynx is clear.   Pulmonary:      Effort: Pulmonary effort is normal.   Musculoskeletal:         General: Normal range of motion.   Neurological:      General: No focal deficit present.      Mental Status: He is alert.   Psychiatric:         Mood and Affect: Mood normal.         Behavior: Behavior normal.         Assessment/Plan   Problem List Items Addressed This Visit    None  Visit Diagnoses         Codes    Bilateral impacted cerumen    -  Primary H61.23

## 2024-12-30 NOTE — PATIENT INSTRUCTIONS
Left canal is clear.   Right still has some wax build up.   Use Debrox at night for a 3 nights and then flush with warm water.

## 2025-01-08 DIAGNOSIS — I48.19 PERSISTENT ATRIAL FIBRILLATION (MULTI): ICD-10-CM

## 2025-01-08 RX ORDER — APIXABAN 5 MG/1
5 TABLET, FILM COATED ORAL 2 TIMES DAILY
Qty: 180 TABLET | Refills: 3 | Status: SHIPPED | OUTPATIENT
Start: 2025-01-08

## 2025-02-08 DIAGNOSIS — I48.19 PERSISTENT ATRIAL FIBRILLATION (MULTI): ICD-10-CM

## 2025-02-10 RX ORDER — METOPROLOL SUCCINATE 50 MG/1
50 TABLET, EXTENDED RELEASE ORAL 2 TIMES DAILY
Qty: 180 TABLET | Refills: 2 | OUTPATIENT
Start: 2025-02-10

## 2025-02-20 ENCOUNTER — TELEPHONE (OUTPATIENT)
Dept: CARDIOLOGY | Facility: CLINIC | Age: 70
End: 2025-02-20
Payer: MEDICARE

## 2025-02-20 DIAGNOSIS — I48.19 PERSISTENT ATRIAL FIBRILLATION (MULTI): ICD-10-CM

## 2025-02-20 RX ORDER — METOPROLOL SUCCINATE 50 MG/1
50 TABLET, EXTENDED RELEASE ORAL 2 TIMES DAILY
Qty: 180 TABLET | Refills: 0 | Status: SHIPPED | OUTPATIENT
Start: 2025-02-20

## 2025-03-05 ENCOUNTER — APPOINTMENT (OUTPATIENT)
Dept: CARDIOLOGY | Facility: CLINIC | Age: 70
End: 2025-03-05
Payer: MEDICARE

## 2025-03-19 ENCOUNTER — OFFICE VISIT (OUTPATIENT)
Dept: CARDIOLOGY | Facility: CLINIC | Age: 70
End: 2025-03-19
Payer: MEDICARE

## 2025-03-19 VITALS
BODY MASS INDEX: 31.84 KG/M2 | DIASTOLIC BLOOD PRESSURE: 51 MMHG | HEART RATE: 94 BPM | SYSTOLIC BLOOD PRESSURE: 92 MMHG | WEIGHT: 248 LBS

## 2025-03-19 DIAGNOSIS — I35.0 NONRHEUMATIC AORTIC (VALVE) STENOSIS: ICD-10-CM

## 2025-03-19 DIAGNOSIS — E78.2 MIXED HYPERLIPIDEMIA: ICD-10-CM

## 2025-03-19 DIAGNOSIS — I48.11 LONGSTANDING PERSISTENT ATRIAL FIBRILLATION (MULTI): ICD-10-CM

## 2025-03-19 DIAGNOSIS — I10 PRIMARY HYPERTENSION: Primary | ICD-10-CM

## 2025-03-19 PROCEDURE — 99214 OFFICE O/P EST MOD 30 MIN: CPT | Performed by: INTERNAL MEDICINE

## 2025-03-19 PROCEDURE — 3078F DIAST BP <80 MM HG: CPT | Performed by: INTERNAL MEDICINE

## 2025-03-19 PROCEDURE — 1160F RVW MEDS BY RX/DR IN RCRD: CPT | Performed by: INTERNAL MEDICINE

## 2025-03-19 PROCEDURE — 1159F MED LIST DOCD IN RCRD: CPT | Performed by: INTERNAL MEDICINE

## 2025-03-19 PROCEDURE — 1036F TOBACCO NON-USER: CPT | Performed by: INTERNAL MEDICINE

## 2025-03-19 PROCEDURE — 3074F SYST BP LT 130 MM HG: CPT | Performed by: INTERNAL MEDICINE

## 2025-03-19 NOTE — PROGRESS NOTES
Chief Complaint:   Atrial Fibrillation     History Of Present Illness:    Flavio Velazquez is a 69 y.o. male presenting with for routine follow-up of long-standing (>12 months) persistent atrial fibrillation.  The patient has been asymptomatic.  The patient has been rate-controlled in atrial fibrillation on medical treatment which they are tolerating well and are compliant.  The current treatment strategy is rate control.  The CHADS2-VASC2 score is 2  and the ACC/AHA guidelines recommend anticoagulation for stroke prophylaxis.  The patient is being treated with Eliquis and has tolerated treatment with no bleeding and is compliant.      Review of Systems  All pertinent systems have been reviewed and are negative except for what is stated in the history of present illness.    All other systems have been reviewed and are negative and noncontributory to this patient's current ailments.  .     Last Recorded Vitals:  Visit Vitals  BP 92/51 (BP Location: Right arm, Patient Position: Sitting, BP Cuff Size: Large adult)   Pulse 94   Wt 112 kg (248 lb)   BMI 31.84 kg/m²   Smoking Status Never   BSA 2.42 m²        Past Medical History:  He has a past medical history of Encounter for general adult medical examination without abnormal findings (06/08/2021), Impacted cerumen, bilateral (09/01/2020), Localized edema (10/07/2019), Longstanding persistent atrial fibrillation (Multi) (10/23/2023), Lymphedema, not elsewhere classified (05/08/2019), Other fecal abnormalities (09/29/2020), Other specified soft tissue disorders (11/07/2018), Suppurative otitis media, unspecified, right ear (11/17/2020), and Transient cerebral ischemic attack, unspecified (10/28/2014).    Past Surgical History:  He has a past surgical history that includes MR angio neck wo IV contrast (10/21/2014); MR angio head wo IV contrast (10/21/2014); Knee surgery (Right); and LASIK.      Social History:  He reports that he has never smoked. He has never been exposed to  tobacco smoke. He has never used smokeless tobacco. He reports current alcohol use of about 14.0 standard drinks of alcohol per week. He reports that he does not use drugs.    Family History:  Family History   Problem Relation Name Age of Onset    No Known Problems Mother      No Known Problems Father          Allergies:  Patient has no known allergies.    Outpatient Medications:  Current Outpatient Medications   Medication Instructions    allopurinol (ZYLOPRIM) 300 mg, oral, Daily    atorvastatin (LIPITOR) 40 mg, oral, Daily    celecoxib (CELEBREX) 200 mg, Daily    Eliquis 5 mg, oral, 2 times daily    metoprolol succinate XL (TOPROL-XL) 50 mg, oral, 2 times daily    pantoprazole (PROTONIX) 40 mg, oral, Daily    tamsulosin (FLOMAX) 0.4 mg, oral, Daily       Physical Exam:  Physical Exam  Vitals reviewed.   Constitutional:       General: He is not in acute distress.     Appearance: Normal appearance.   HENT:      Head: Normocephalic and atraumatic.      Nose: Nose normal.   Eyes:      Conjunctiva/sclera: Conjunctivae normal.   Cardiovascular:      Rate and Rhythm: Normal rate. Rhythm irregular.      Pulses: Normal pulses.      Heart sounds: Murmur heard.      Systolic murmur is present with a grade of 1/6.   Pulmonary:      Effort: Pulmonary effort is normal. No respiratory distress.      Breath sounds: Normal breath sounds. No wheezing, rhonchi or rales.   Abdominal:      General: Bowel sounds are normal. There is no distension.      Palpations: Abdomen is soft.      Tenderness: There is no abdominal tenderness.   Musculoskeletal:         General: No swelling.      Right lower leg: No edema.      Left lower leg: No edema.   Skin:     General: Skin is warm and dry.      Capillary Refill: Capillary refill takes less than 2 seconds.   Neurological:      General: No focal deficit present.      Mental Status: He is alert.   Psychiatric:         Mood and Affect: Mood normal.           Last Labs:  CBC -  Lab Results    Component Value Date    WBC 5.5 04/05/2024    HGB 13.5 04/05/2024    HCT 41.0 04/05/2024     (H) 04/05/2024     04/05/2024       CMP -  Lab Results   Component Value Date    CALCIUM 8.9 04/05/2024    PROT 7.1 04/05/2024    ALBUMIN 4.2 04/05/2024    AST 38 04/05/2024    ALT 23 04/05/2024    ALKPHOS 58 04/05/2024    BILITOT 1.1 04/05/2024       LIPID PANEL -   Lab Results   Component Value Date    CHOL 143 04/05/2024    HDL 57.4 04/05/2024    CHHDL 2.5 04/05/2024    VLDL 31 04/05/2024    TRIG 155 (H) 04/05/2024    NHDL 86 04/05/2024       RENAL FUNCTION PANEL -   Lab Results   Component Value Date    K 4.5 04/05/2024       Lab Results   Component Value Date    HGBA1C 5.7 10/07/2019       Last Cardiology Tests:  ECG:  No results found for this or any previous visit from the past 1095 days.  Reviewed Labs  Echo:  No echocardiogram results found for the past 12 months           Assessment/Plan       1. Longstanding persistent atrial fibrillation (CMS/HCC)  Persistent trial fibrillation: The patient has been clinically stable, asymptomatic with persistent, rate-controlled atrial fibrillation as detailed in the HPI.  They will continue treatment with rate-controlling medications (Metoprolol) and anticoagulation (Eliquis) for stroke prophylaxis based upon their present [id number] score, the risks and benefits of which were discussed with the patient/family/caregiver. Stop Digoxin.  - ECG 12 lead (Clinic Performed)  - Follow Up In Cardiology; Future    2. Primary hypertension  Hypertension: The patient's blood pressure has been well-controlled at today's appointment or by recent primary care provider's measurements/home measurements and meets their goal blood pressure per the ACC/AHA guidelines.  The patient has been compliant with their anti-hypertensive medications and is following a low sodium/DASH diet and performs regular exercise.  I advised continuation of their present medical treatment and lifestyle  modification.     - Follow Up In Cardiology; Future    3. Mixed hyperlipidemia  Hyperlipidemia: The patient's lipids are well controlled on chronic atorvastatin therapy and they are meeting their goal LDL cholesterol per the ACC/AHA guidelines.  The patient is compliant and tolerating statin therapy and is following a heart health diet.  The benefits of lipid lowering therapy have been discussed with the patient/family/caregiver.     - Follow Up In Cardiology; Future    4. Nonrheumatic aortic (valve) stenosis  Mild ASX aortic stenosis.  - Follow Up In Cardiology; Future     Flavio Velazquez will return in 1 year for an office visit with Echo.     Jalil Armenta MD    Exclusive of any other services or procedures performed, I, Jalil Armenta MD , spent 30 minutes in duration for this visit today.  This time consisted of chart review, obtaining history, and/or performing the exam as documented above as well as documenting the clinical information for the encounter in the electronic record, discussing treatment options, plans, and/or goals with patient, family, and/or caregiver, refilling medications, updating the electronic record, ordering medicines, lab work, imaging, referrals, and/or procedures as documented above and communicating with other [facility] professionals. I have discussed the results of laboratory, radiology, and cardiology studies with the patient and their family/caregiver.

## 2025-03-31 ENCOUNTER — APPOINTMENT (OUTPATIENT)
Dept: CARDIOLOGY | Facility: CLINIC | Age: 70
End: 2025-03-31
Payer: MEDICARE

## 2025-04-01 ENCOUNTER — APPOINTMENT (OUTPATIENT)
Dept: PRIMARY CARE | Facility: CLINIC | Age: 70
End: 2025-04-01
Payer: MEDICARE

## 2025-05-20 ENCOUNTER — TELEPHONE (OUTPATIENT)
Dept: CARDIOLOGY | Facility: CLINIC | Age: 70
End: 2025-05-20
Payer: MEDICARE

## 2025-05-20 DIAGNOSIS — I48.19 PERSISTENT ATRIAL FIBRILLATION (MULTI): ICD-10-CM

## 2025-05-20 RX ORDER — METOPROLOL SUCCINATE 50 MG/1
50 TABLET, EXTENDED RELEASE ORAL 2 TIMES DAILY
Qty: 180 TABLET | Refills: 3 | Status: SHIPPED | OUTPATIENT
Start: 2025-05-20

## 2025-06-03 ENCOUNTER — APPOINTMENT (OUTPATIENT)
Dept: PRIMARY CARE | Facility: CLINIC | Age: 70
End: 2025-06-03
Payer: MEDICARE

## 2025-07-07 DIAGNOSIS — E78.5 HYPERLIPIDEMIA, UNSPECIFIED HYPERLIPIDEMIA TYPE: ICD-10-CM

## 2025-07-07 RX ORDER — ATORVASTATIN CALCIUM 40 MG/1
40 TABLET, FILM COATED ORAL DAILY
Qty: 90 TABLET | Refills: 0 | Status: SHIPPED | OUTPATIENT
Start: 2025-07-07

## 2025-07-07 NOTE — TELEPHONE ENCOUNTER
Pt is calling in for med refill on    Atorvastatin 40mg    LOV: 04/05/24  NOV: 09/05/25    Pharmacy: [facility]

## 2025-09-05 ENCOUNTER — APPOINTMENT (OUTPATIENT)
Dept: PRIMARY CARE | Facility: CLINIC | Age: 70
End: 2025-09-05
Payer: MEDICARE

## 2025-09-05 PROBLEM — L89.892 PRESSURE ULCER OF OTHER SITE, STAGE 2 (MULTI): Status: RESOLVED | Noted: 2024-12-30 | Resolved: 2025-09-05

## 2025-09-05 PROBLEM — N17.9 AKI (ACUTE KIDNEY INJURY): Status: RESOLVED | Noted: 2018-09-27 | Resolved: 2025-09-05

## 2025-09-05 ASSESSMENT — ENCOUNTER SYMPTOMS
FATIGUE: 0
DIARRHEA: 0
HEADACHES: 0
SHORTNESS OF BREATH: 0
WEAKNESS: 0
NAUSEA: 0
CHEST TIGHTNESS: 0
VOMITING: 0
COUGH: 0
NUMBNESS: 1
DIZZINESS: 0
PALPITATIONS: 0
FEVER: 0
ABDOMINAL PAIN: 0
CHILLS: 0
ARTHRALGIAS: 1

## 2025-09-05 ASSESSMENT — ACTIVITIES OF DAILY LIVING (ADL)
MANAGING_FINANCES: INDEPENDENT
TAKING_MEDICATION: INDEPENDENT
BATHING: INDEPENDENT
DRESSING: INDEPENDENT
GROCERY_SHOPPING: INDEPENDENT
DOING_HOUSEWORK: INDEPENDENT

## 2026-09-08 ENCOUNTER — APPOINTMENT (OUTPATIENT)
Dept: PRIMARY CARE | Facility: CLINIC | Age: 71
End: 2026-09-08
Payer: MEDICARE